# Patient Record
Sex: FEMALE | Race: WHITE | NOT HISPANIC OR LATINO | Employment: UNEMPLOYED | URBAN - NONMETROPOLITAN AREA
[De-identification: names, ages, dates, MRNs, and addresses within clinical notes are randomized per-mention and may not be internally consistent; named-entity substitution may affect disease eponyms.]

---

## 2023-10-18 ENCOUNTER — HOSPITAL ENCOUNTER (OUTPATIENT)
Dept: RADIOLOGY | Facility: HOSPITAL | Age: 48
Discharge: HOME | End: 2023-10-18
Payer: COMMERCIAL

## 2023-10-18 DIAGNOSIS — Z12.31 ENCOUNTER FOR SCREENING MAMMOGRAM FOR MALIGNANT NEOPLASM OF BREAST: ICD-10-CM

## 2023-10-18 PROCEDURE — 77063 BREAST TOMOSYNTHESIS BI: CPT | Mod: 50

## 2023-10-18 PROCEDURE — 77067 SCR MAMMO BI INCL CAD: CPT

## 2023-10-18 PROCEDURE — 77063 BREAST TOMOSYNTHESIS BI: CPT | Mod: BILATERAL PROCEDURE | Performed by: RADIOLOGY

## 2023-10-18 PROCEDURE — 77067 SCR MAMMO BI INCL CAD: CPT | Mod: BILATERAL PROCEDURE | Performed by: RADIOLOGY

## 2024-02-27 ENCOUNTER — OFFICE VISIT (OUTPATIENT)
Dept: PRIMARY CARE | Facility: CLINIC | Age: 49
End: 2024-02-27
Payer: COMMERCIAL

## 2024-02-27 VITALS
SYSTOLIC BLOOD PRESSURE: 140 MMHG | HEART RATE: 94 BPM | HEIGHT: 67 IN | BODY MASS INDEX: 24.63 KG/M2 | DIASTOLIC BLOOD PRESSURE: 90 MMHG | WEIGHT: 156.9 LBS

## 2024-02-27 DIAGNOSIS — G89.29 CHRONIC PAIN OF RIGHT KNEE: ICD-10-CM

## 2024-02-27 DIAGNOSIS — I10 PRIMARY HYPERTENSION: Primary | ICD-10-CM

## 2024-02-27 DIAGNOSIS — K21.9 GASTROESOPHAGEAL REFLUX DISEASE, UNSPECIFIED WHETHER ESOPHAGITIS PRESENT: ICD-10-CM

## 2024-02-27 DIAGNOSIS — M25.561 CHRONIC PAIN OF RIGHT KNEE: ICD-10-CM

## 2024-02-27 PROCEDURE — 3077F SYST BP >= 140 MM HG: CPT | Performed by: FAMILY MEDICINE

## 2024-02-27 PROCEDURE — 3080F DIAST BP >= 90 MM HG: CPT | Performed by: FAMILY MEDICINE

## 2024-02-27 PROCEDURE — 99214 OFFICE O/P EST MOD 30 MIN: CPT | Performed by: FAMILY MEDICINE

## 2024-02-27 RX ORDER — OMEPRAZOLE 40 MG/1
40 CAPSULE, DELAYED RELEASE ORAL
Qty: 30 CAPSULE | Refills: 11 | Status: SHIPPED | OUTPATIENT
Start: 2024-02-27 | End: 2024-03-08

## 2024-02-27 RX ORDER — LISINOPRIL 10 MG/1
10 TABLET ORAL DAILY
Qty: 30 TABLET | Refills: 5 | Status: SHIPPED | OUTPATIENT
Start: 2024-02-27 | End: 2024-04-24 | Stop reason: SDUPTHER

## 2024-02-27 NOTE — PROGRESS NOTES
Subjective   Carolyne Mi is a 48 y.o. female who presents for No chief complaint on file..  Here for a check up - she was previously a patient of Suyapa Mohr, last seen here Nov 2021.      She states that she fell on her right knee a couple of years ago and it locks up sometimes.  She would like to get a disability placard.  She is not interested in Xrays or referral to ortho at this time.      She is having issues with heartburn recently as well.      Her BP is running high, it has been high in the past as well.  She has not been on medication for it.              Objective   Visit Vitals  /90 (BP Location: Left arm, Patient Position: Sitting, BP Cuff Size: Adult)   Pulse 94      Physical Exam  Vitals reviewed.   Constitutional:       General: She is not in acute distress.  Cardiovascular:      Rate and Rhythm: Normal rate and regular rhythm.      Heart sounds: No murmur heard.  Pulmonary:      Effort: Pulmonary effort is normal. No respiratory distress.      Breath sounds: Normal breath sounds.   Skin:     General: Skin is warm and dry.   Neurological:      General: No focal deficit present.      Mental Status: She is alert. Mental status is at baseline.         Assessment/Plan   Problem List Items Addressed This Visit    None  Visit Diagnoses       Primary hypertension    -  Primary    Relevant Medications    lisinopril 10 mg tablet    Other Relevant Orders    CBC and Auto Differential    Comprehensive Metabolic Panel    Lipid Panel    TSH with reflex to Free T4 if abnormal    Vitamin B12    Gastroesophageal reflux disease, unspecified whether esophagitis present        Relevant Medications    omeprazole (PriLOSEC) 40 mg DR capsule    Chronic pain of right knee        Relevant Orders    Disability Placard               Mitra Cruz MD

## 2024-02-27 NOTE — PROGRESS NOTES
Subjective   Patient ID: Carolyne Mi is a 48 y.o. female who presents for yearly check up     HPI     Review of Systems    Objective   There were no vitals taken for this visit.    Physical Exam    Assessment/Plan

## 2024-02-28 ENCOUNTER — LAB (OUTPATIENT)
Dept: LAB | Facility: LAB | Age: 49
End: 2024-02-28
Payer: COMMERCIAL

## 2024-02-28 DIAGNOSIS — I10 PRIMARY HYPERTENSION: ICD-10-CM

## 2024-02-28 LAB
ALBUMIN SERPL BCP-MCNC: 4.3 G/DL (ref 3.4–5)
ALP SERPL-CCNC: 56 U/L (ref 33–110)
ALT SERPL W P-5'-P-CCNC: 9 U/L (ref 7–45)
ANION GAP SERPL CALC-SCNC: 10 MMOL/L (ref 10–20)
AST SERPL W P-5'-P-CCNC: 12 U/L (ref 9–39)
BASOPHILS # BLD AUTO: 0.05 X10*3/UL (ref 0–0.1)
BASOPHILS NFR BLD AUTO: 0.5 %
BILIRUB SERPL-MCNC: 0.5 MG/DL (ref 0–1.2)
BUN SERPL-MCNC: 7 MG/DL (ref 6–23)
CALCIUM SERPL-MCNC: 9.3 MG/DL (ref 8.6–10.3)
CHLORIDE SERPL-SCNC: 108 MMOL/L (ref 98–107)
CHOLEST SERPL-MCNC: 163 MG/DL (ref 0–199)
CHOLESTEROL/HDL RATIO: 5.8
CO2 SERPL-SCNC: 26 MMOL/L (ref 21–32)
CREAT SERPL-MCNC: 0.86 MG/DL (ref 0.5–1.05)
EGFRCR SERPLBLD CKD-EPI 2021: 84 ML/MIN/1.73M*2
EOSINOPHIL # BLD AUTO: 0.19 X10*3/UL (ref 0–0.7)
EOSINOPHIL NFR BLD AUTO: 1.7 %
ERYTHROCYTE [DISTWIDTH] IN BLOOD BY AUTOMATED COUNT: 13.9 % (ref 11.5–14.5)
GLUCOSE SERPL-MCNC: 87 MG/DL (ref 74–99)
HCT VFR BLD AUTO: 48.2 % (ref 36–46)
HDLC SERPL-MCNC: 28 MG/DL
HGB BLD-MCNC: 15.5 G/DL (ref 12–16)
IMM GRANULOCYTES # BLD AUTO: 0.06 X10*3/UL (ref 0–0.7)
IMM GRANULOCYTES NFR BLD AUTO: 0.6 % (ref 0–0.9)
LDLC SERPL CALC-MCNC: 80 MG/DL
LYMPHOCYTES # BLD AUTO: 3.86 X10*3/UL (ref 1.2–4.8)
LYMPHOCYTES NFR BLD AUTO: 35.5 %
MCH RBC QN AUTO: 27.6 PG (ref 26–34)
MCHC RBC AUTO-ENTMCNC: 32.2 G/DL (ref 32–36)
MCV RBC AUTO: 86 FL (ref 80–100)
MONOCYTES # BLD AUTO: 0.74 X10*3/UL (ref 0.1–1)
MONOCYTES NFR BLD AUTO: 6.8 %
NEUTROPHILS # BLD AUTO: 5.96 X10*3/UL (ref 1.2–7.7)
NEUTROPHILS NFR BLD AUTO: 54.9 %
NON HDL CHOLESTEROL: 135 MG/DL (ref 0–149)
NRBC BLD-RTO: 0 /100 WBCS (ref 0–0)
PLATELET # BLD AUTO: 173 X10*3/UL (ref 150–450)
POTASSIUM SERPL-SCNC: 4.4 MMOL/L (ref 3.5–5.3)
PROT SERPL-MCNC: 7 G/DL (ref 6.4–8.2)
RBC # BLD AUTO: 5.61 X10*6/UL (ref 4–5.2)
SODIUM SERPL-SCNC: 140 MMOL/L (ref 136–145)
TRIGL SERPL-MCNC: 273 MG/DL (ref 0–149)
TSH SERPL-ACNC: 2.21 MIU/L (ref 0.44–3.98)
VIT B12 SERPL-MCNC: 169 PG/ML (ref 211–911)
VLDL: 55 MG/DL (ref 0–40)
WBC # BLD AUTO: 10.9 X10*3/UL (ref 4.4–11.3)

## 2024-02-28 PROCEDURE — 82607 VITAMIN B-12: CPT

## 2024-02-28 PROCEDURE — 80061 LIPID PANEL: CPT

## 2024-02-28 PROCEDURE — 85025 COMPLETE CBC W/AUTO DIFF WBC: CPT

## 2024-02-28 PROCEDURE — 36415 COLL VENOUS BLD VENIPUNCTURE: CPT

## 2024-02-28 PROCEDURE — 84443 ASSAY THYROID STIM HORMONE: CPT

## 2024-02-28 PROCEDURE — 80053 COMPREHEN METABOLIC PANEL: CPT

## 2024-03-08 ENCOUNTER — OFFICE VISIT (OUTPATIENT)
Dept: PRIMARY CARE | Facility: CLINIC | Age: 49
End: 2024-03-08
Payer: COMMERCIAL

## 2024-03-08 VITALS
BODY MASS INDEX: 24.08 KG/M2 | HEART RATE: 97 BPM | DIASTOLIC BLOOD PRESSURE: 82 MMHG | SYSTOLIC BLOOD PRESSURE: 130 MMHG | WEIGHT: 153.39 LBS | HEIGHT: 67 IN | OXYGEN SATURATION: 96 %

## 2024-03-08 DIAGNOSIS — K21.9 GASTROESOPHAGEAL REFLUX DISEASE, UNSPECIFIED WHETHER ESOPHAGITIS PRESENT: Primary | ICD-10-CM

## 2024-03-08 DIAGNOSIS — E53.8 VITAMIN B12 DEFICIENCY: ICD-10-CM

## 2024-03-08 PROCEDURE — 99213 OFFICE O/P EST LOW 20 MIN: CPT | Performed by: FAMILY MEDICINE

## 2024-03-08 RX ORDER — FAMOTIDINE 20 MG/1
20 TABLET, FILM COATED ORAL 2 TIMES DAILY
Qty: 60 TABLET | Refills: 5 | Status: SHIPPED | OUTPATIENT
Start: 2024-03-08 | End: 2024-04-24 | Stop reason: SDUPTHER

## 2024-03-08 RX ORDER — LANOLIN ALCOHOL/MO/W.PET/CERES
1000 CREAM (GRAM) TOPICAL DAILY
Qty: 30 TABLET | Refills: 11 | Status: SHIPPED | OUTPATIENT
Start: 2024-03-08 | End: 2024-04-24 | Stop reason: SDUPTHER

## 2024-03-08 RX ORDER — PANTOPRAZOLE SODIUM 40 MG/1
40 TABLET, DELAYED RELEASE ORAL DAILY
Qty: 30 TABLET | Refills: 5 | Status: SHIPPED | OUTPATIENT
Start: 2024-03-08 | End: 2024-04-24 | Stop reason: SDUPTHER

## 2024-03-08 NOTE — PROGRESS NOTES
Subjective   Patient ID: Carolyne Mi is a 48 y.o. female who presents for follow up. Was originally suppose to be 2 weeks but the patient states their heartburn has gotten worse. Patient states when they lay down is when the heartburn starts.,     HPI     Review of Systems    Objective   There were no vitals taken for this visit.    Physical Exam    Assessment/Plan

## 2024-03-12 ENCOUNTER — APPOINTMENT (OUTPATIENT)
Dept: PRIMARY CARE | Facility: CLINIC | Age: 49
End: 2024-03-12

## 2024-03-22 ENCOUNTER — OFFICE VISIT (OUTPATIENT)
Dept: PRIMARY CARE | Facility: CLINIC | Age: 49
End: 2024-03-22
Payer: COMMERCIAL

## 2024-03-22 ENCOUNTER — OFFICE VISIT (OUTPATIENT)
Dept: OBSTETRICS AND GYNECOLOGY | Facility: CLINIC | Age: 49
End: 2024-03-22
Payer: COMMERCIAL

## 2024-03-22 VITALS
DIASTOLIC BLOOD PRESSURE: 68 MMHG | WEIGHT: 152.1 LBS | HEIGHT: 67 IN | HEART RATE: 100 BPM | BODY MASS INDEX: 23.87 KG/M2 | SYSTOLIC BLOOD PRESSURE: 104 MMHG

## 2024-03-22 VITALS
WEIGHT: 152.4 LBS | SYSTOLIC BLOOD PRESSURE: 112 MMHG | DIASTOLIC BLOOD PRESSURE: 70 MMHG | BODY MASS INDEX: 23.92 KG/M2 | HEIGHT: 67 IN

## 2024-03-22 DIAGNOSIS — Z12.31 ENCOUNTER FOR SCREENING MAMMOGRAM FOR BREAST CANCER: ICD-10-CM

## 2024-03-22 DIAGNOSIS — K21.9 GASTROESOPHAGEAL REFLUX DISEASE WITHOUT ESOPHAGITIS: ICD-10-CM

## 2024-03-22 DIAGNOSIS — Z01.419 ENCOUNTER FOR GYNECOLOGICAL EXAMINATION WITHOUT ABNORMAL FINDING: ICD-10-CM

## 2024-03-22 DIAGNOSIS — K21.9 GASTROESOPHAGEAL REFLUX DISEASE, UNSPECIFIED WHETHER ESOPHAGITIS PRESENT: Primary | ICD-10-CM

## 2024-03-22 DIAGNOSIS — Z12.4 ENCOUNTER FOR SCREENING FOR CERVICAL CANCER: ICD-10-CM

## 2024-03-22 PROCEDURE — 99213 OFFICE O/P EST LOW 20 MIN: CPT | Performed by: FAMILY MEDICINE

## 2024-03-22 PROCEDURE — 88175 CYTOPATH C/V AUTO FLUID REDO: CPT

## 2024-03-22 PROCEDURE — 99396 PREV VISIT EST AGE 40-64: CPT | Performed by: OBSTETRICS & GYNECOLOGY

## 2024-03-22 RX ORDER — SUCRALFATE 1 G/1
1 TABLET ORAL
Qty: 120 TABLET | Refills: 11 | Status: SHIPPED | OUTPATIENT
Start: 2024-03-22 | End: 2024-04-24 | Stop reason: SDUPTHER

## 2024-03-22 NOTE — PROGRESS NOTES
Carolyne Mi is a 48 y.o. female who is here for a routine exam. PCP = Mitra Cruz MD    Chief Complaint   Patient presents with    Gynecologic Exam     Patient is here for yearly exam and pap test. Patient does do regular self breast exams and has no concerns at this time. LMP: 24.          Presents for annual exam. She voices no complaints and is doing well. Denies any bowel or bladder problems. Denies any breast problems.  She had previous tubal sterilization.    OB History          2    Para   2    Term   0       2    AB   0    Living   2         SAB   0    IAB   0    Ectopic   0    Multiple   0    Live Births   2                  Social History     Substance and Sexual Activity   Sexual Activity Yes    Birth control/protection: Female Sterilization     Current contraception: Tubal Sterilization    Past Medical History:   Diagnosis Date    Encounter for  delivery without indication     Delivery of pregnancy by  section    Encounter for gynecological examination (general) (routine) without abnormal findings 2020    Pap test, as part of routine gynecological examination    Other conditions influencing health status     Menstruation    Personal history of other diseases of the female genital tract     History of menorrhagia    Personal history of other medical treatment     History of screening mammography    Personal history of other mental and behavioral disorders     History of major depression    Personal history of suicidal behavior     History of suicide attempt       Past Surgical History:   Procedure Laterality Date    OTHER SURGICAL HISTORY  2020    Colposcopy    TUBAL LIGATION         Past med hx and past surg hx reviewed and notable for: none    Review of Systems:   Constitutional: No fever or chills  Respiratory: No shortness of breath, or cough  Cardiovascular: No chest pain or syncope  Breasts: No breast pain, no masses, no nipple  "discharge  Gastrointestinal: No nausea, vomiting, or diarrhea, no abdominal pain  Genitourinary: No dysuria or frequency  Gynecology: Negative except as noted in history of present illness  All other: All other systems reviewed and negative for complaint    Objective   /70   Ht 1.702 m (5' 7\")   Wt 69.1 kg (152 lb 6.4 oz)   LMP 02/29/2024   BMI 23.87 kg/m²     PHYSICAL EXAMINATION:  Well-developed, well nourished, in no acute distress, alert and oriented x three, is pleasant and cooperative.   HEENT: Clear. Pupils equal, round and reactive to light and accommodation. Extraocular muscles are intact. Oral mucosa pink without exudate.   NECK: No lymphadenopathy, no thyromegaly.  BREASTS: Symmetric, no palpable masses. No nipple discharge or retraction.  LUNGS: Clear bilaterally.  HEART: Regular rate and rhythm without murmurs.  ABDOMEN: Normoactive bowel sounds, soft and nontender, no guarding or rebound tenderness, no CVA tenderness.  EXTREMITIES: No clubbing, cyanosis or edema.  NEUROLOGIC:  Cranial nerves II-XII grossly intact.  :  Normal external female genitalia, normal vulva, normal vagina. Normal urethral meatus, urethra and bladder. Normal appearing cervix. Normal-sized uterus, no adnexal masses or tenderness. Pap smear performed today.      Actions performed during this visit include:  - Clinical breast exam  - Clinical pelvic exam  - No orders of the defined types were placed in this encounter.       Problem List Items Addressed This Visit    None  Visit Diagnoses       Encounter for gynecological examination without abnormal finding        Relevant Orders    THINPREP PAP TEST    Encounter for screening for cervical cancer        Relevant Orders    THINPREP PAP TEST    Encounter for screening mammogram for breast cancer                 Provider Impression:  1.  Annual  She already had her mammogram performed earlier in the year.    Thank you for coming to your annual exam. Your findings during the " exam were normal.  Please return for your next visit in 1 year.

## 2024-03-22 NOTE — PROGRESS NOTES
Subjective   Patient ID: Carolyne Mi is a 48 y.o. female who presents for follow up to heartburn. Patient is still waking up with heartburn during the night. Patient has cut back on caffine, sugar.         HPI     Review of Systems    Objective   LMP 02/29/2024     Physical Exam    Assessment/Plan

## 2024-03-22 NOTE — PROGRESS NOTES
Subjective   Carolyne Mi is a 48 y.o. female who presents for No chief complaint on file..  Here for follow up heartburn.  She continues to struggle with it, still waking up at night.  At her last visit we switched to protonix and added some pepcid as well.  She states that it is better during the day, but still bothering her at night.  She has tried to cut back on caffeine, watching her diet better.              Objective   Visit Vitals  /68 (BP Location: Left arm, Patient Position: Sitting, BP Cuff Size: Adult)   Pulse 100      Physical Exam  Vitals reviewed.   Constitutional:       General: She is not in acute distress.  Cardiovascular:      Rate and Rhythm: Normal rate and regular rhythm.      Heart sounds: No murmur heard.  Pulmonary:      Effort: Pulmonary effort is normal. No respiratory distress.      Breath sounds: Normal breath sounds.   Skin:     General: Skin is warm and dry.   Neurological:      General: No focal deficit present.      Mental Status: She is alert. Mental status is at baseline.         Assessment/Plan   Problem List Items Addressed This Visit    None  Visit Diagnoses       Gastroesophageal reflux disease, unspecified whether esophagitis present    -  Primary    Relevant Medications    sucralfate (Carafate) 1 gram tablet    Other Relevant Orders    EGD    Follow Up In Primary Care - Established               Mitra Cruz MD

## 2024-03-22 NOTE — PATIENT INSTRUCTIONS
Given her persistent symptoms that are waking her up at night we will add sucralfate and also get EGD set up.  Follow up here in 1 month.

## 2024-03-29 ENCOUNTER — TELEPHONE (OUTPATIENT)
Dept: PRIMARY CARE | Facility: CLINIC | Age: 49
End: 2024-03-29

## 2024-03-29 NOTE — TELEPHONE ENCOUNTER
Patient states their heartburn has finally eased up some. She is asking if she can take the sucralfate 3 times a day now instead of 4?

## 2024-04-01 LAB
CYTOLOGY CMNT CVX/VAG CYTO-IMP: NORMAL
LAB AP HPV GENOTYPE QUESTION: YES
LAB AP HPV HR: NORMAL
LABORATORY COMMENT REPORT: NORMAL
LMP START DATE: NORMAL
PATH REPORT.TOTAL CANCER: NORMAL

## 2024-04-10 ENCOUNTER — HOSPITAL ENCOUNTER (OUTPATIENT)
Dept: GASTROENTEROLOGY | Facility: CLINIC | Age: 49
Setting detail: OUTPATIENT SURGERY
Discharge: HOME | End: 2024-04-10
Payer: COMMERCIAL

## 2024-04-10 VITALS
WEIGHT: 149.03 LBS | HEIGHT: 62 IN | RESPIRATION RATE: 16 BRPM | OXYGEN SATURATION: 100 % | DIASTOLIC BLOOD PRESSURE: 75 MMHG | BODY MASS INDEX: 27.43 KG/M2 | TEMPERATURE: 98.3 F | SYSTOLIC BLOOD PRESSURE: 150 MMHG | HEART RATE: 82 BPM

## 2024-04-10 DIAGNOSIS — K21.9 GASTROESOPHAGEAL REFLUX DISEASE WITHOUT ESOPHAGITIS: Primary | ICD-10-CM

## 2024-04-10 PROCEDURE — 43239 EGD BIOPSY SINGLE/MULTIPLE: CPT | Performed by: INTERNAL MEDICINE

## 2024-04-10 PROCEDURE — 2500000005 HC RX 250 GENERAL PHARMACY W/O HCPCS: Performed by: INTERNAL MEDICINE

## 2024-04-10 PROCEDURE — 88305 TISSUE EXAM BY PATHOLOGIST: CPT | Performed by: PATHOLOGY

## 2024-04-10 PROCEDURE — 7100000010 HC PHASE TWO TIME - EACH INCREMENTAL 1 MINUTE

## 2024-04-10 PROCEDURE — 7100000009 HC PHASE TWO TIME - INITIAL BASE CHARGE

## 2024-04-10 PROCEDURE — 88305 TISSUE EXAM BY PATHOLOGIST: CPT | Mod: TC,SUR,SAMLAB | Performed by: INTERNAL MEDICINE

## 2024-04-10 PROCEDURE — 2500000004 HC RX 250 GENERAL PHARMACY W/ HCPCS (ALT 636 FOR OP/ED): Performed by: INTERNAL MEDICINE

## 2024-04-10 RX ORDER — FENTANYL CITRATE 50 UG/ML
INJECTION, SOLUTION INTRAMUSCULAR; INTRAVENOUS AS NEEDED
Status: COMPLETED | OUTPATIENT
Start: 2024-04-10 | End: 2024-04-10

## 2024-04-10 RX ORDER — MIDAZOLAM HYDROCHLORIDE 1 MG/ML
INJECTION, SOLUTION INTRAMUSCULAR; INTRAVENOUS AS NEEDED
Status: COMPLETED | OUTPATIENT
Start: 2024-04-10 | End: 2024-04-10

## 2024-04-10 RX ORDER — SODIUM CHLORIDE, SODIUM LACTATE, POTASSIUM CHLORIDE, CALCIUM CHLORIDE 600; 310; 30; 20 MG/100ML; MG/100ML; MG/100ML; MG/100ML
20 INJECTION, SOLUTION INTRAVENOUS CONTINUOUS
Status: DISCONTINUED | OUTPATIENT
Start: 2024-04-10 | End: 2024-04-11 | Stop reason: HOSPADM

## 2024-04-10 RX ADMIN — FENTANYL CITRATE 50 MCG: 50 INJECTION, SOLUTION INTRAMUSCULAR; INTRAVENOUS at 10:39

## 2024-04-10 RX ADMIN — MIDAZOLAM 2.5 MG: 1 INJECTION INTRAMUSCULAR; INTRAVENOUS at 10:39

## 2024-04-10 RX ADMIN — MIDAZOLAM 2.5 MG: 1 INJECTION INTRAMUSCULAR; INTRAVENOUS at 10:36

## 2024-04-10 RX ADMIN — FENTANYL CITRATE 50 MCG: 50 INJECTION, SOLUTION INTRAMUSCULAR; INTRAVENOUS at 10:36

## 2024-04-10 RX ADMIN — SODIUM CHLORIDE, POTASSIUM CHLORIDE, SODIUM LACTATE AND CALCIUM CHLORIDE 20 ML/HR: 600; 310; 30; 20 INJECTION, SOLUTION INTRAVENOUS at 09:47

## 2024-04-10 RX ADMIN — BENZOCAINE, BUTAMBEN, AND TETRACAINE HYDROCHLORIDE 2 SPRAY: .028; .004; .004 AEROSOL, SPRAY TOPICAL at 10:35

## 2024-04-10 ASSESSMENT — PAIN - FUNCTIONAL ASSESSMENT
PAIN_FUNCTIONAL_ASSESSMENT: 0-10
PAIN_FUNCTIONAL_ASSESSMENT: 0-10

## 2024-04-10 ASSESSMENT — PAIN SCALES - GENERAL
PAINLEVEL_OUTOF10: 0 - NO PAIN
PAINLEVEL_OUTOF10: 3
PAINLEVEL_OUTOF10: 0 - NO PAIN

## 2024-04-10 ASSESSMENT — COLUMBIA-SUICIDE SEVERITY RATING SCALE - C-SSRS
6. HAVE YOU EVER DONE ANYTHING, STARTED TO DO ANYTHING, OR PREPARED TO DO ANYTHING TO END YOUR LIFE?: NO
1. IN THE PAST MONTH, HAVE YOU WISHED YOU WERE DEAD OR WISHED YOU COULD GO TO SLEEP AND NOT WAKE UP?: NO
2. HAVE YOU ACTUALLY HAD ANY THOUGHTS OF KILLING YOURSELF?: NO

## 2024-04-10 NOTE — H&P
History Of Present Illness  Carolyne Mi is a 48 y.o. female presenting with recalcitrant GERD presents for EGD for diagnostic reasons..     Past Medical History  Past Medical History:   Diagnosis Date    Encounter for  delivery without indication     Delivery of pregnancy by  section    Encounter for gynecological examination (general) (routine) without abnormal findings 2020    Pap test, as part of routine gynecological examination    GERD (gastroesophageal reflux disease)     Hypertension     Other conditions influencing health status     Menstruation    Personal history of other diseases of the female genital tract     History of menorrhagia    Personal history of other medical treatment     History of screening mammography    Personal history of other mental and behavioral disorders     History of major depression    Personal history of suicidal behavior     History of suicide attempt     Surgical History  Past Surgical History:   Procedure Laterality Date    OTHER SURGICAL HISTORY  2020    Colposcopy    TUBAL LIGATION       Social History  She reports that she has been smoking cigarettes. She has never used smokeless tobacco. She reports current alcohol use. She reports that she does not currently use drugs.    Family History  Family History   Problem Relation Name Age of Onset    Heart attack Mother      Lung disease Father      Cancer Father          Allergies  No Known Allergies  Review of Systems  Pre-sedation Evaluation:  ASA Classification - ASA 2 - Patient with mild systemic disease with no functional limitations  Mallampati Score - II (hard and soft palate, upper portion of tonsils anduvula visible)    Physical Exam  Vitals and nursing note reviewed.   Constitutional:       Appearance: Normal appearance.   HENT:      Head: Normocephalic.      Mouth/Throat:      Mouth: Mucous membranes are moist.      Pharynx: Oropharynx is clear.   Eyes:      Conjunctiva/sclera:  "Conjunctivae normal.      Pupils: Pupils are equal, round, and reactive to light.   Cardiovascular:      Rate and Rhythm: Normal rate and regular rhythm.      Heart sounds: Normal heart sounds.   Pulmonary:      Effort: Pulmonary effort is normal.      Breath sounds: Normal breath sounds.   Abdominal:      General: Abdomen is flat. Bowel sounds are normal.      Palpations: Abdomen is soft.   Musculoskeletal:      Cervical back: Normal range of motion and neck supple.   Skin:     General: Skin is warm and dry.   Neurological:      General: No focal deficit present.      Mental Status: She is alert and oriented to person, place, and time.   Psychiatric:         Behavior: Behavior normal.          Last Recorded Vitals  Blood pressure 132/85, pulse 83, temperature 36.7 °C (98.1 °F), temperature source Temporal, resp. rate 16, height 1.575 m (5' 2\"), weight 67.6 kg (149 lb 0.5 oz), last menstrual period 02/29/2024, SpO2 99%.     Assessment/Plan   Problem List Items Addressed This Visit    None  Visit Diagnoses       Gastroesophageal reflux disease without esophagitis    -  Primary    Relevant Orders    EGD               PTA/Current Medications:  (Not in a hospital admission)    Current Outpatient Medications   Medication Sig Dispense Refill    cyanocobalamin (Vitamin B-12) 1,000 mcg tablet Take 1 tablet (1,000 mcg) by mouth once daily. 30 tablet 11    famotidine (Pepcid) 20 mg tablet Take 1 tablet (20 mg) by mouth 2 times a day. 60 tablet 5    lisinopril 10 mg tablet Take 1 tablet (10 mg) by mouth once daily. 30 tablet 5    pantoprazole (ProtoNix) 40 mg EC tablet Take 1 tablet (40 mg) by mouth once daily. Do not crush, chew, or split. 30 tablet 5    sucralfate (Carafate) 1 gram tablet Take 1 tablet (1 g) by mouth 4 times a day before meals. Before meals and at bedtime 120 tablet 11     Current Facility-Administered Medications   Medication Dose Route Frequency Provider Last Rate Last Admin    lactated Ringer's infusion  " 20 mL/hr intravenous Continuous Colt Flores DO 20 mL/hr at 04/10/24 0947 20 mL/hr at 04/10/24 0947     Colt Flores DO

## 2024-04-10 NOTE — DISCHARGE INSTRUCTIONS

## 2024-04-18 LAB
LABORATORY COMMENT REPORT: NORMAL
PATH REPORT.FINAL DX SPEC: NORMAL
PATH REPORT.GROSS SPEC: NORMAL
PATH REPORT.TOTAL CANCER: NORMAL

## 2024-04-23 ENCOUNTER — APPOINTMENT (OUTPATIENT)
Dept: PRIMARY CARE | Facility: CLINIC | Age: 49
End: 2024-04-23

## 2024-04-23 ENCOUNTER — PHARMACY VISIT (OUTPATIENT)
Dept: PHARMACY | Facility: CLINIC | Age: 49
End: 2024-04-23
Payer: MEDICAID

## 2024-04-23 PROCEDURE — RXMED WILLOW AMBULATORY MEDICATION CHARGE

## 2024-04-24 ENCOUNTER — OFFICE VISIT (OUTPATIENT)
Dept: PRIMARY CARE | Facility: CLINIC | Age: 49
End: 2024-04-24
Payer: COMMERCIAL

## 2024-04-24 VITALS
HEIGHT: 62 IN | HEART RATE: 86 BPM | WEIGHT: 151.8 LBS | DIASTOLIC BLOOD PRESSURE: 66 MMHG | BODY MASS INDEX: 27.94 KG/M2 | OXYGEN SATURATION: 97 % | SYSTOLIC BLOOD PRESSURE: 114 MMHG

## 2024-04-24 DIAGNOSIS — I10 PRIMARY HYPERTENSION: ICD-10-CM

## 2024-04-24 DIAGNOSIS — K21.9 GASTROESOPHAGEAL REFLUX DISEASE, UNSPECIFIED WHETHER ESOPHAGITIS PRESENT: ICD-10-CM

## 2024-04-24 DIAGNOSIS — E53.8 VITAMIN B12 DEFICIENCY: ICD-10-CM

## 2024-04-24 PROCEDURE — 99214 OFFICE O/P EST MOD 30 MIN: CPT | Performed by: FAMILY MEDICINE

## 2024-04-24 PROCEDURE — 3074F SYST BP LT 130 MM HG: CPT | Performed by: FAMILY MEDICINE

## 2024-04-24 PROCEDURE — 3078F DIAST BP <80 MM HG: CPT | Performed by: FAMILY MEDICINE

## 2024-04-24 RX ORDER — LANOLIN ALCOHOL/MO/W.PET/CERES
1000 CREAM (GRAM) TOPICAL DAILY
Qty: 90 TABLET | Refills: 3 | Status: SHIPPED | OUTPATIENT
Start: 2024-04-24 | End: 2025-04-24

## 2024-04-24 RX ORDER — LISINOPRIL 10 MG/1
10 TABLET ORAL DAILY
Qty: 90 TABLET | Refills: 1 | Status: SHIPPED | OUTPATIENT
Start: 2024-04-24 | End: 2024-10-21

## 2024-04-24 RX ORDER — PANTOPRAZOLE SODIUM 40 MG/1
40 TABLET, DELAYED RELEASE ORAL DAILY
Qty: 90 TABLET | Refills: 1 | Status: SHIPPED | OUTPATIENT
Start: 2024-04-24 | End: 2024-10-21

## 2024-04-24 RX ORDER — FAMOTIDINE 20 MG/1
20 TABLET, FILM COATED ORAL 2 TIMES DAILY
Qty: 180 TABLET | Refills: 1 | Status: SHIPPED | OUTPATIENT
Start: 2024-04-24 | End: 2024-10-21

## 2024-04-24 RX ORDER — SUCRALFATE 1 G/1
1 TABLET ORAL
Qty: 360 TABLET | Refills: 1 | Status: SHIPPED | OUTPATIENT
Start: 2024-04-24 | End: 2024-10-21

## 2024-04-24 NOTE — PROGRESS NOTES
Subjective   Carolyne Mi is a 48 y.o. female who presents for Follow-up (Patient here with her fiance, Ronaldo, for a check-up.  Patient voices no complaints at present.).  Here for follow up GERD.  She recently had EGD which was consistent with gastritis.  She is waiting to get an appointment with GI now.  Her symptoms are much better - she has cut back on the carafate to tid unless she needs a fourth dose.              Objective   Visit Vitals  /66 (BP Location: Left arm, Patient Position: Sitting, BP Cuff Size: Adult)   Pulse 86      Physical Exam  Vitals reviewed.   Constitutional:       General: She is not in acute distress.  Cardiovascular:      Rate and Rhythm: Normal rate and regular rhythm.      Heart sounds: No murmur heard.  Pulmonary:      Effort: Pulmonary effort is normal. No respiratory distress.      Breath sounds: Normal breath sounds.   Skin:     General: Skin is warm and dry.   Neurological:      General: No focal deficit present.      Mental Status: She is alert. Mental status is at baseline.         Assessment/Plan   Problem List Items Addressed This Visit       Gastroesophageal reflux disease    Relevant Medications    famotidine (Pepcid) 20 mg tablet    pantoprazole (ProtoNix) 40 mg EC tablet    sucralfate (Carafate) 1 gram tablet    Primary hypertension    Relevant Medications    lisinopril 10 mg tablet    Vitamin B12 deficiency    Relevant Medications    cyanocobalamin (Vitamin B-12) 1,000 mcg tablet          Mitra Cruz MD

## 2024-05-08 ENCOUNTER — TELEPHONE (OUTPATIENT)
Dept: GASTROENTEROLOGY | Facility: CLINIC | Age: 49
End: 2024-05-08
Payer: COMMERCIAL

## 2024-05-08 NOTE — TELEPHONE ENCOUNTER
PATIENT NOTIFIED AND VERBALIZED UNDERSTANDING     ----- Message from Colt Flores DO sent at 5/7/2024  6:42 AM EDT -----  Upper endoscopy revealed minimal reflux changes no hiatal hernia biopsies for H. pylori and celiac disease were negative.  Follow-up with family doctor

## 2024-05-31 ENCOUNTER — HOSPITAL ENCOUNTER (EMERGENCY)
Facility: HOSPITAL | Age: 49
Discharge: HOME | End: 2024-05-31
Payer: COMMERCIAL

## 2024-05-31 ENCOUNTER — APPOINTMENT (OUTPATIENT)
Dept: RADIOLOGY | Facility: HOSPITAL | Age: 49
End: 2024-05-31
Payer: COMMERCIAL

## 2024-05-31 VITALS
DIASTOLIC BLOOD PRESSURE: 78 MMHG | HEART RATE: 78 BPM | TEMPERATURE: 97.3 F | SYSTOLIC BLOOD PRESSURE: 144 MMHG | HEIGHT: 67 IN | RESPIRATION RATE: 18 BRPM | OXYGEN SATURATION: 100 % | WEIGHT: 152 LBS | BODY MASS INDEX: 23.86 KG/M2

## 2024-05-31 DIAGNOSIS — M25.562 ACUTE PAIN OF LEFT KNEE: Primary | ICD-10-CM

## 2024-05-31 PROCEDURE — 73560 X-RAY EXAM OF KNEE 1 OR 2: CPT | Mod: LEFT SIDE | Performed by: RADIOLOGY

## 2024-05-31 PROCEDURE — 99283 EMERGENCY DEPT VISIT LOW MDM: CPT

## 2024-05-31 PROCEDURE — 73560 X-RAY EXAM OF KNEE 1 OR 2: CPT | Mod: LT

## 2024-05-31 ASSESSMENT — PAIN DESCRIPTION - DESCRIPTORS
DESCRIPTORS: ACHING
DESCRIPTORS: ACHING

## 2024-05-31 ASSESSMENT — PAIN DESCRIPTION - ORIENTATION: ORIENTATION: LEFT

## 2024-05-31 ASSESSMENT — PAIN SCALES - GENERAL: PAINLEVEL_OUTOF10: 8

## 2024-05-31 ASSESSMENT — PAIN DESCRIPTION - LOCATION: LOCATION: KNEE

## 2024-05-31 ASSESSMENT — PAIN - FUNCTIONAL ASSESSMENT: PAIN_FUNCTIONAL_ASSESSMENT: 0-10

## 2024-05-31 ASSESSMENT — PAIN DESCRIPTION - PAIN TYPE: TYPE: ACUTE PAIN

## 2024-05-31 NOTE — DISCHARGE INSTRUCTIONS
Ice elevate and rest.  Tylenol Motrin for pain.  Use as directed.  Ace wrap as needed for comfort.  Crutches as needed for comfort.  Follow-up with orthopedic specialist listed above or your orthopedic specialist within the next week or so.

## 2024-05-31 NOTE — ED PROVIDER NOTES
"HPI   Chief Complaint   Patient presents with    Knee Pain     Patient reports she bent down to  a cat last Saturday and her left knee \"popped\" and has hurt since.        Patient presents with left knee pain for over a week now.  States she bent over and when she stood up she felt a pop in her knee and she has had pain since.  States that she cannot fully extend it but she is able to bend it.  No self treatment.  Pain is worse with movement and ambulation.  Relief at rest.  She denies any swelling or discoloration.  Denies any other injury.      History provided by:  Patient                      [place] Coma Scale Score: 15                     Patient History   Past Medical History:   Diagnosis Date    Encounter for  delivery without indication ([facility])     Delivery of pregnancy by  section    Encounter for gynecological examination (general) (routine) without abnormal findings 2020    Pap test, as part of routine gynecological examination    GERD (gastroesophageal reflux disease)     Hypertension     Other conditions influencing health status     Menstruation    Personal history of other diseases of the female genital tract     History of menorrhagia    Personal history of other medical treatment     History of screening mammography    Personal history of other mental and behavioral disorders     History of major depression    Personal history of suicidal behavior     History of suicide attempt     Past Surgical History:   Procedure Laterality Date     SECTION, CLASSIC       and     OTHER SURGICAL HISTORY  2020    Colposcopy    TUBAL LIGATION       Family History   Problem Relation Name Age of Onset    Hypertension Mother      Lung disease Father      Cancer Father       Social History     Tobacco Use    Smoking status: Every Day     Current packs/day: 0.50     Average packs/day: 1 pack/day for 31.2 years (31.1 ttl pk-yrs)     Types: Cigarettes     Start date: " 4/1/1993     Last attempt to quit: 4/10/2024    Smokeless tobacco: Never   Vaping Use    Vaping status: Former    Substances: Nicotine    Devices: Disposable, Pre-filled or refillable cartridge   Substance Use Topics    Alcohol use: Yes    Drug use: Not Currently       Physical Exam   ED Triage Vitals [05/31/24 1156]   Temperature Heart Rate Respirations BP   36.3 °C (97.3 °F) 81 16 141/79      Pulse Ox Temp Source Heart Rate Source Patient Position   100 % Temporal Monitor Sitting      BP Location FiO2 (%)     Left arm --       Physical Exam  Vitals and nursing note reviewed.   Constitutional:       General: She is not in acute distress.     Appearance: Normal appearance. She is well-developed, well-groomed and normal weight. She is not ill-appearing or toxic-appearing.   HENT:      Head: Normocephalic.      Right Ear: External ear normal.      Left Ear: External ear normal.      Nose: Nose normal.      Mouth/Throat:      Mouth: Mucous membranes are moist.   Eyes:      General: No scleral icterus.     Conjunctiva/sclera: Conjunctivae normal.   Musculoskeletal:         General: Tenderness present.      Cervical back: No tenderness.      Left knee: No swelling, effusion, bony tenderness or crepitus. Decreased range of motion (Patient lacks about the last 5 degrees of full extension). Tenderness present over the medial joint line. No patellar tendon tenderness.   Skin:     General: Skin is warm.      Capillary Refill: Capillary refill takes less than 2 seconds.   Neurological:      General: No focal deficit present.      Mental Status: She is alert and oriented to person, place, and time.      Cranial Nerves: No cranial nerve deficit or facial asymmetry.      Sensory: No sensory deficit.      Motor: No weakness.      Comments: Patient has analgesic gait   Psychiatric:         Attention and Perception: Attention and perception normal.         Mood and Affect: Mood and affect normal.         Speech: Speech normal.          Behavior: Behavior normal. Behavior is cooperative.         Thought Content: Thought content normal.         Cognition and Memory: Cognition and memory normal.         Judgment: Judgment normal.         ED Course & MDM   Diagnoses as of 05/31/24 1350   Acute pain of left knee       Medical Decision Making  Patient presents with left knee pain for over a week now.  States she bent over and when she stood up she felt a pop in her knee and she has had pain since.  States that she cannot fully extend it but she is able to bend it.  No self treatment.  Pain is worse with movement and ambulation.  Relief at rest.  She denies any swelling or discoloration.  Denies any other injury.    Ddx: Fracture, contusion, strain, sprain, other    Will obtain x-rays  No acute findings are noted by radiologist   patient declined pain medication while in the ED  Will Ace wrap patient's knee and give her crutches to help with ambulation.  She is encouraged to follow-up with orthopedics within the next week.  Continue Tylenol Motrin for pain.  Ice elevate and rest.  Patient discharged home in improved stable condition      Amount and/or Complexity of Data Reviewed  Radiology: ordered and independent interpretation performed. Decision-making details documented in ED Course.    Risk  OTC drugs.  Diagnosis or treatment significantly limited by social determinants of health.        Procedure  Procedures     Shahla Avilez PA-C  05/31/24 1350

## 2024-06-03 ENCOUNTER — OFFICE VISIT (OUTPATIENT)
Dept: ORTHOPEDIC SURGERY | Facility: CLINIC | Age: 49
End: 2024-06-03
Payer: COMMERCIAL

## 2024-06-03 DIAGNOSIS — M25.562 ACUTE PAIN OF LEFT KNEE: ICD-10-CM

## 2024-06-03 DIAGNOSIS — S86.912A KNEE STRAIN, LEFT, INITIAL ENCOUNTER: Primary | ICD-10-CM

## 2024-06-03 PROCEDURE — 99204 OFFICE O/P NEW MOD 45 MIN: CPT | Performed by: NURSE PRACTITIONER

## 2024-06-03 RX ORDER — IBUPROFEN 600 MG/1
600 TABLET ORAL EVERY 8 HOURS PRN
Qty: 28 TABLET | Refills: 1 | Status: SHIPPED | OUTPATIENT
Start: 2024-06-03

## 2024-06-03 ASSESSMENT — ENCOUNTER SYMPTOMS
CARDIOVASCULAR NEGATIVE: 1
RESPIRATORY NEGATIVE: 1
CONSTITUTIONAL NEGATIVE: 1
NEUROLOGICAL NEGATIVE: 1
PSYCHIATRIC NEGATIVE: 1
HEMATOLOGIC/LYMPHATIC NEGATIVE: 1
ARTHRALGIAS: 1
ENDOCRINE NEGATIVE: 1

## 2024-06-03 ASSESSMENT — PAIN DESCRIPTION - DESCRIPTORS: DESCRIPTORS: SHARP;SHOOTING;ACHING;DULL

## 2024-06-03 ASSESSMENT — PAIN - FUNCTIONAL ASSESSMENT: PAIN_FUNCTIONAL_ASSESSMENT: 0-10

## 2024-06-03 ASSESSMENT — PAIN SCALES - GENERAL: PAINLEVEL_OUTOF10: 8

## 2024-06-03 NOTE — PROGRESS NOTES
"Subjective    Patient ID: Carolyne Mi is a 48 y.o. female.    Chief Complaint: Pain and New Patient Visit of the Left Knee (Patient states about 22 weeks ago she heard a \"popping noise\" when she stood up from laying down on the ground.  X-rays were completed on 05/31/2024.  She was given an ace wrap and crutches.)     JAZMINE Romero is a pleasant 48-year-old female presenting to the Ortho injury clinic for evaluation of left knee pain and popping sensation.  This occurred on 5/25/2024 when she was sitting up from lying down and twisted her knee while picking up a cat.  States she heard a popping sensation and has had pain since.  Did not notice any swelling or discoloration.  Was seen in ED on 5/31/2024, given an Ace wrap and crutches.  Patient is putting about 50% weightbearing on her leg, taking Tylenol approximately twice daily which does seem to help with pain.  Denies any prior injuries or surgeries to this knee.  Symptoms are aggravated with full weightbearing and attempt to straighten leg.    Review of Systems   Constitutional: Negative.    HENT: Negative.     Respiratory: Negative.     Cardiovascular: Negative.    Endocrine: Negative.    Musculoskeletal:  Positive for arthralgias.   Skin: Negative.    Neurological: Negative.    Hematological: Negative.    Psychiatric/Behavioral: Negative.          Objective   Right Knee Exam   Right knee exam is normal.      Left Knee Exam     Tenderness   The patient is experiencing tenderness in the medial joint line.    Range of Motion   Extension:  5   Flexion:  110     Tests   Edgar:  Medial - negative Lateral - negative  Varus: negative Valgus: negative  Lachman:  Anterior - negative      Drawer:  Anterior - negative     Posterior - negative    Other   Erythema: absent  Sensation: normal  Pulse: present  Swelling: mild  Effusion: no effusion present    Comments:  No instability noted, positive increased pain with medial Apley's testing.  Distracted exam allows for full " extension of the knee, increased pain with attempt with straight leg lift, quad strength strong with knee bent to lift upper leg.  Positive tenderness to palpation and motion to the medial aspect, mild swelling noted, no discoloration.  Distal motor and sensory intact, cap refill at 2 seconds.  Full ROM of distal joints with no sx aggravation            Image Results:  XR knee left 1-2 views  Narrative: STUDY:  Knee Radiographs; 5/31/2024, 12:15  INDICATION:  Pain.  COMPARISON:  None Available.  ACCESSION NUMBER(S):  [id number]  ORDERING CLINICIAN:  FRED GONZALEZ  TECHNIQUE:  2 view(s) of the left knee.  FINDINGS:    There is no displaced fracture.  The alignment is anatomic.  No soft  tissue abnormality is seen.  There is no joint effusion.  Impression: No acute osseous abnormality.  Signed by Winston Buckley MD    Independent review of knee images completed on today's visit.  There is no acute fracture, misalignment or degenerative changes noted.    Assessment/Plan   Encounter Diagnoses:  Problem List Items Addressed This Visit             ICD-10-CM    Knee strain, left, initial encounter - Primary S86.912A     Prescription for ibuprofen 600 mg provided and instructed to take 3 times daily for the next 3 days with food, then may decrease to as needed.  Patient may use Tylenol per package directions in addition for additional symptom control.  Patient to be fitted in a hinged knee brace to wear with weightbearing activities, may remove with rest and sleep  May continue crutches with partial weightbearing over the next 2 to 3 days and wean as tolerated  Instructed on frequent elevation, rest and icing as well as hourly ambulation while awake.  Plan to follow-up here in approximately 2 to 3 weeks, sooner for changes or concerns.  This note was generated using Dragon software.  It may contain errors in wording, punctuation or spelling.          Other Visit Diagnoses         Codes    Acute pain of left knee     M25.562     Relevant Medications    ibuprofen 600 mg tablet

## 2024-06-03 NOTE — ASSESSMENT & PLAN NOTE
Prescription for ibuprofen 600 mg provided and instructed to take 3 times daily for the next 3 days with food, then may decrease to as needed.  Patient may use Tylenol per package directions in addition for additional symptom control.  Patient to be fitted in a hinged knee brace to wear with weightbearing activities, may remove with rest and sleep  May continue crutches with partial weightbearing over the next 2 to 3 days and wean as tolerated  Instructed on frequent elevation, rest and icing as well as hourly ambulation while awake.  Plan to follow-up here in approximately 2 to 3 weeks, sooner for changes or concerns.  This note was generated using Dragon software.  It may contain errors in wording, punctuation or spelling.

## 2024-06-04 ENCOUNTER — APPOINTMENT (OUTPATIENT)
Dept: ORTHOPEDIC SURGERY | Facility: CLINIC | Age: 49
End: 2024-06-04
Payer: COMMERCIAL

## 2024-06-17 ENCOUNTER — APPOINTMENT (OUTPATIENT)
Dept: ORTHOPEDIC SURGERY | Facility: CLINIC | Age: 49
End: 2024-06-17
Payer: COMMERCIAL

## 2024-06-17 DIAGNOSIS — S86.912A KNEE STRAIN, LEFT, INITIAL ENCOUNTER: Primary | ICD-10-CM

## 2024-06-17 PROCEDURE — 99213 OFFICE O/P EST LOW 20 MIN: CPT | Performed by: NURSE PRACTITIONER

## 2024-06-17 ASSESSMENT — ENCOUNTER SYMPTOMS
CONSTITUTIONAL NEGATIVE: 1
ENDOCRINE NEGATIVE: 1
RESPIRATORY NEGATIVE: 1
PSYCHIATRIC NEGATIVE: 1
CARDIOVASCULAR NEGATIVE: 1
HEMATOLOGIC/LYMPHATIC NEGATIVE: 1
ARTHRALGIAS: 1
KNEE DEFORMITY: 1
NEUROLOGICAL NEGATIVE: 1

## 2024-06-17 ASSESSMENT — PAIN SCALES - GENERAL: PAINLEVEL_OUTOF10: 7

## 2024-06-17 ASSESSMENT — PAIN DESCRIPTION - DESCRIPTORS: DESCRIPTORS: DULL;ACHING

## 2024-06-17 ASSESSMENT — PAIN - FUNCTIONAL ASSESSMENT: PAIN_FUNCTIONAL_ASSESSMENT: 0-10

## 2024-06-17 NOTE — ASSESSMENT & PLAN NOTE
May continue with ibuprofen up to 3 times daily with food or snack, patient may use Tylenol per package directions.  Continue to wear hinged knee brace with weightbearing activities, remove with rest and sleep.  May DC use of crutches  Instructed on frequent elevation, rest and icing.  Gradually increase weightbearing activity as tolerated  PT referral for eval and treat, okay to wean out of brace as tolerated  Plan to follow-up here in approximately 4 weeks, sooner for changes or concerns.  This note was generated using Dragon software.  It may contain errors in wording, punctuation or spelling.

## 2024-06-17 NOTE — PROGRESS NOTES
"Subjective    Patient ID: Carolyne Mi is a 49 y.o. female.    Chief Complaint: Pain of the Left Knee (Pt has been wearing the knee brace and using crutches as needed.)     Mark Knee       Heather is a pleasant 48-year-old female presenting for FUV evaluation of left knee pain and popping sensation.  This occurred on 5/25/2024 when she was sitting up from lying down and twisted her knee while picking up a cat. Denies any prior injuries or surgeries to this knee.   Overall improvement from initial visit approximately 50%  Hinged brace helps some, crutches,\"not hardly at all\"  Ibuprofen about once daily helps some  Home stretching and exercises, no further popping      Review of Systems   Constitutional: Negative.    HENT: Negative.     Respiratory: Negative.     Cardiovascular: Negative.    Endocrine: Negative.    Musculoskeletal:  Positive for arthralgias.   Skin: Negative.    Neurological: Negative.    Hematological: Negative.    Psychiatric/Behavioral: Negative.          Objective   Right Knee Exam   Right knee exam is normal.      Left Knee Exam     Tenderness   The patient is experiencing tenderness in the medial joint line.    Range of Motion   Extension:  0   Flexion:  110     Tests   Edgar:  Medial - negative Lateral - negative  Varus: negative Valgus: negative  Lachman:  Anterior - negative      Drawer:  Anterior - negative     Posterior - negative    Other   Erythema: absent  Sensation: normal  Pulse: present  Swelling: mild  Effusion: no effusion present    Comments:  No instability noted, symptoms aggravated with medial Edgar and medial Apley's.  Positive tenderness to palpation and motion to the medial aspect, mild swelling noted, no discoloration.  Distal motor and sensory intact, cap refill at 2 seconds.  Full ROM of distal joints with no sx aggravation.  Ambulates up and down the maynard and hinged knee brace with slight left-sided limp          Image Results:  XR knee left 1-2 views  Narrative: " STUDY:  Knee Radiographs; 5/31/2024, 12:15  INDICATION:  Pain.  COMPARISON:  None Available.  ACCESSION NUMBER(S):  [id number]  ORDERING CLINICIAN:  FRED GONZALEZ  TECHNIQUE:  2 view(s) of the left knee.  FINDINGS:    There is no displaced fracture.  The alignment is anatomic.  No soft  tissue abnormality is seen.  There is no joint effusion.  Impression: No acute osseous abnormality.  Signed by Winston Buckley MD      Assessment/Plan   Encounter Diagnoses:  Problem List Items Addressed This Visit             ICD-10-CM    Knee strain, left, initial encounter S86.912A     May continue with ibuprofen up to 3 times daily with food or snack, patient may use Tylenol per package directions.  Continue to wear hinged knee brace with weightbearing activities, remove with rest and sleep.  May DC use of crutches  Instructed on frequent elevation, rest and icing.  Gradually increase weightbearing activity as tolerated  PT referral for eval and treat, okay to wean out of brace as tolerated  Plan to follow-up here in approximately 4 weeks, sooner for changes or concerns.  This note was generated using Dragon software.  It may contain errors in wording, punctuation or spelling.         Relevant Orders    Referral to Physical Therapy

## 2024-06-20 ENCOUNTER — EVALUATION (OUTPATIENT)
Dept: PHYSICAL THERAPY | Facility: CLINIC | Age: 49
End: 2024-06-20
Payer: COMMERCIAL

## 2024-06-20 DIAGNOSIS — S86.912A KNEE STRAIN, LEFT, INITIAL ENCOUNTER: ICD-10-CM

## 2024-06-20 DIAGNOSIS — M25.562 LEFT MEDIAL KNEE PAIN: Primary | ICD-10-CM

## 2024-06-20 PROCEDURE — 97161 PT EVAL LOW COMPLEX 20 MIN: CPT | Mod: GP | Performed by: PHYSICAL THERAPIST

## 2024-06-20 PROCEDURE — 97110 THERAPEUTIC EXERCISES: CPT | Mod: GP | Performed by: PHYSICAL THERAPIST

## 2024-06-20 ASSESSMENT — PAIN SCALES - GENERAL: PAINLEVEL_OUTOF10: 7

## 2024-06-20 ASSESSMENT — ENCOUNTER SYMPTOMS
OCCASIONAL FEELINGS OF UNSTEADINESS: 0
DEPRESSION: 0
LOSS OF SENSATION IN FEET: 0

## 2024-06-20 ASSESSMENT — PAIN DESCRIPTION - DESCRIPTORS: DESCRIPTORS: DULL;ACHING

## 2024-06-20 ASSESSMENT — PAIN - FUNCTIONAL ASSESSMENT: PAIN_FUNCTIONAL_ASSESSMENT: 0-10

## 2024-06-20 NOTE — PROGRESS NOTES
"Physical Therapy    Physical Therapy Lower Extremity Evaluation    Patient Name: Carolyne Mi  MRN: [id number]  Today's Date: 2024  Time Calculation  Start Time: 1334  Stop Time: 1406  Time Calculation (min): 32 min  PT Evaluation Time Entry  PT Evaluation (Low) Time Entry: 14  PT Therapeutic Procedures Time Entry  Therapeutic Exercise Time Entry: 16                   Current Problem  Problem List Items Addressed This Visit             ICD-10-CM    Knee strain, left, initial encounter S86.912A    Relevant Orders    Follow Up In Physical Therapy    Left medial knee pain - Primary M25.562    Relevant Orders    Follow Up In Physical Therapy          SUBJECTIVE  Subjective   Patient reports she tried to get cat out from under car, reached down and grabbed cat and when she stood back up it \"popped\" and pain in (medial) side of knee.  IS wearing a brace currently.  Patient states it has gotten a \"little bit\" better but still can't straighten it.  States she has been trying to do some exercises herself at home but is still having trouble.  Denies any N&T in Les.  Denies pain any where other than knee.  Had xray and it was ok    General  Reason for Referral: knee strain  Referred By: Ok  Precautions  Precautions  STEADI Fall Risk Score (The score of 4 or more indicates an increased risk of falling): 0  Medical Precautions: No known precautions/limitation       Pain  Pain Assessment: 0-10  0-10 (Numeric) Pain Score: 7  Pain Location: Knee  Pain Orientation: Left  Pain Descriptors: Dull, Aching  Was icing it but no longer, sometimes ibuprofen helps, sometimes Aspercreme    SUBJECTIVE:   Patient verified by name and .  Chief complaint:  L knee pain, unable to fully straighten      Imaging: xra y WNL    Prior level of function:    Painfree, WNL AROM    Current limitations:   Difficulty negotiating stairs, difficulty walking, unable straighten knee    Home setup:   Lives in basement of relative so has to negotiate " "steps regularly    Work: n/a    Patients goal: painfree, full ROM, normal walking    Prior tx: none    OBJECTIVE:    Lower Extremity Strength: R=WNL, L hip WNL, L knee 3-/5    Lower Extremity ROM: WNL unless documented below:  ROM in Degrees  RIGHT LEFT   Hip Flexion     Hip Extension     Hip Abduction     Hip Adduction     Knee Extension 0 Lacking 10 deg   Knee Flexion 130 125   Ankle DF     Ankle PF     Ankle INV     Ankle EV        L knee  Joint mobility WNL   SLS (level):   R=15 sec plus no sway or LOB;  L=3 sec significant sway      Gait mechanics: antalgic, flexed L knee, walks on tiptoes  Palpation significant tenderness medial knee at tendon insertion (negative along patella, negative joint line on medial)    Neurological symptoms none  Special tests:     Outcome Measure  Other Measures  Lower Extremity Funtional Score (LEFS): 13        KNEE RIGHT LEFT   Edgar's   neg   Apley's     Joint Line Tenderness RIGHT LEFT   Anterior Drawer   Neg    Posterior Drawer   neg   Valgus stress   neg   Varus Stress   neg         TREATMENT:  Standing calf stretch 30\" and HEP  Seated hamstring stretch 30\" and HEP  SLS level surface 30\" and HEP  Ice massage medial knee L completed and instructed for HEP    Pulsed US 50% 3.3W/cm2 to medial knee at distal tendon insertion A    Manual: stretching L knee (emphasis on extension) A    Gait training:  Heel strike on initial contact, push off toes    OP EDUCATION:  HEP:  Access Code: [email or web address]  URL: [email or web address]  Date: 06/20/2024  Prepared by: Calista Moreno    Exercises  - Gastroc Stretch on Wall  - 2 x daily - 7 x weekly - 2 sets - 2 reps - 30 sec hold  - Seated Hamstring Stretch with Chair  - 2 x daily - 7 x weekly - 2 sets - 2 reps - 30 sec hold  - Standing Single Leg Stance with Counter Support  - 2 x daily - 7 x weekly - 2 sets - 2 reps - 15 sec hold    ASSESSEMENT  Pt is a 49 y.o. referred to physical therapy for a dx of L knee pain, difficulty " walking, and L knee strain by Sri Euceda APRN* . Pt presents with impairments of  decreased AROM, strength, balance, gait.  Integrity of L knee is good.  PT Assessment Results: Decreased strength, Decreased range of motion, Decreased endurance, Impaired balance, Decreased mobility, Pain  Rehab Prognosis: Good  Assessment Comment: ROM improved following stretching today. This pt would benefit from a therapy program to restore prior level of function, reduce pain, increase AROM, increase strength, and improve gait and balance.     The physical therapy prognosis is good for the patient to achieve their goals.   The pt tolerated therapy treatment today well with no adverse effects.  Barriers to therapy include:  none    PLAN  PT Frequency: 2 times per week  Duration: 7 visits ((need insurance auth))      The pt has been educated about the risks and benefits of physical therapy including manual therapy treatments and gives consent for treatment.     The patient will benefit from physical therapy treatment to include: Treatment/Interventions: Cryotherapy, Education/ Instruction, Manual therapy, Neuromuscular re-education, Self care/ home management, Therapeutic exercises, Ultrasound       Goals:  Active       PT Problem       PT Goal 1       Start:  06/20/24    Expected End:  07/20/24       Patient will have 0-130 deg AROM L knee without discomfort to improve mobility...  3 weeks    Patient will perform SLS on level surface LLE 15 sec 2/3 trials with min to no sway/steady to improve mobility   3 weeks    Patient will report 2/10 pain or less with ambulation and exercise    3 weeks    Patient will be independent with HEP to improve Rom, strength, proprioception/balance and mobility....  3 weeks    LEFS score of 60/80 or better in 1 month

## 2024-06-24 ENCOUNTER — TREATMENT (OUTPATIENT)
Dept: PHYSICAL THERAPY | Facility: CLINIC | Age: 49
End: 2024-06-24
Payer: COMMERCIAL

## 2024-06-24 ENCOUNTER — APPOINTMENT (OUTPATIENT)
Dept: PHYSICAL THERAPY | Facility: CLINIC | Age: 49
End: 2024-06-24
Payer: COMMERCIAL

## 2024-06-24 DIAGNOSIS — M25.562 LEFT MEDIAL KNEE PAIN: ICD-10-CM

## 2024-06-24 DIAGNOSIS — S86.912A KNEE STRAIN, LEFT, INITIAL ENCOUNTER: ICD-10-CM

## 2024-06-24 PROCEDURE — 97110 THERAPEUTIC EXERCISES: CPT | Mod: GP,CQ

## 2024-06-24 PROCEDURE — 97035 APP MDLTY 1+ULTRASOUND EA 15: CPT | Mod: GP,CQ

## 2024-06-24 ASSESSMENT — PAIN - FUNCTIONAL ASSESSMENT: PAIN_FUNCTIONAL_ASSESSMENT: 0-10

## 2024-06-24 ASSESSMENT — PAIN SCALES - GENERAL: PAINLEVEL_OUTOF10: 7

## 2024-06-24 NOTE — PROGRESS NOTES
"Physical Therapy Treatment    Patient Name: Carolyne Mi  MRN: [id number]  Today's Date: 6/24/2024  Time Calculation  Start Time: 0915  Stop Time: 0955  Time Calculation (min): 40 min  PT Therapeutic Procedures Time Entry  Therapeutic Exercise Time Entry: 30  PT Modalities Time Entry  Ultrasound Time Entry: 8    Assessment:   Patient responds well to ultrasound this date. Patient challenged with step ups this date, but no c/o increased symptoms. Demo's improvements pre and post session.     Plan:  Continue to work on improving strength and ROM to be able to tolerate prolonged ambulation with little to no difficulty. -AB.     OP PT Plan  Treatment/Interventions: Cryotherapy, Education/ Instruction, Manual therapy, Neuromuscular re-education, Self care/ home management, Therapeutic exercises, Ultrasound  PT Plan: Skilled PT  PT Frequency: 2 times per week  Duration: 7 visits ((need insurance auth))  Rehab Potential: Good  Plan of Care Agreement: Patient    Current Problem  Problem List Items Addressed This Visit             ICD-10-CM    Knee strain, left, initial encounter S86.912A    Left medial knee pain M25.562       Subjective   General  Reason for Referral: knee strain  Referred By: Ok  HEP: Yes  Patient states that she is doing her HEP at home. States that she feels a little sore right now.     Precautions  Precautions  Medical Precautions: No known precautions/limitation    Pain  Pain Assessment: 0-10  0-10 (Numeric) Pain Score: 7  Pain Location: Knee  Pain Orientation: Left    Objective     Treatments:  Therapeutic Exercise: 30 minutes, 2 units  SciFit x6' (N)  Slantboard 2x1' (N)  Standing calf stretch 30\" and HEP  Seated hamstring stretch 3x30\"   SLS level surface 3x30\"   Step ups 6\" step Fwd/Lateral 2x10 L leading (N)  Ice massage medial knee L completed and instructed for HEP      Modalities: 8  minutes, 1 units  Pulsed US 50% 3.3W/cm2 to medial knee at distal tendon insertion (N)    Manual: stretching " L knee (emphasis on extension) A    OP EDUCATION:   HEP:  Access Code: [email or web address]  URL: [email or web address]  Date: 06/20/2024  Prepared by: Calista Moreno    Exercises  - Gastroc Stretch on Wall  - 2 x daily - 7 x weekly - 2 sets - 2 reps - 30 sec hold  - Seated Hamstring Stretch with Chair  - 2 x daily - 7 x weekly - 2 sets - 2 reps - 30 sec hold  - Standing Single Leg Stance with Counter Support  - 2 x daily - 7 x weekly - 2 sets - 2 reps - 15 sec hold    Goals:  Active       PT Problem       PT Goal 1       Start:  06/20/24    Expected End:  07/20/24       Patient will have 0-130 deg AROM L knee without discomfort to improve mobility...  3 weeks    Patient will perform SLS on level surface LLE 15 sec 2/3 trials with min to no sway/steady to improve mobility   3 weeks    Patient will report 2/10 pain or less with ambulation and exercise    3 weeks    Patient will be independent with HEP to improve Rom, strength, proprioception/balance and mobility....  3 weeks    LEFS score of 60/80 or better in 1 month

## 2024-06-26 ENCOUNTER — TREATMENT (OUTPATIENT)
Dept: PHYSICAL THERAPY | Facility: CLINIC | Age: 49
End: 2024-06-26
Payer: COMMERCIAL

## 2024-06-26 DIAGNOSIS — M25.562 LEFT MEDIAL KNEE PAIN: ICD-10-CM

## 2024-06-26 DIAGNOSIS — S86.912A KNEE STRAIN, LEFT, INITIAL ENCOUNTER: ICD-10-CM

## 2024-06-26 PROCEDURE — 97140 MANUAL THERAPY 1/> REGIONS: CPT | Mod: GP,CQ

## 2024-06-26 PROCEDURE — 97035 APP MDLTY 1+ULTRASOUND EA 15: CPT | Mod: GP,CQ

## 2024-06-26 PROCEDURE — 97110 THERAPEUTIC EXERCISES: CPT | Mod: GP,CQ

## 2024-06-26 ASSESSMENT — PAIN - FUNCTIONAL ASSESSMENT: PAIN_FUNCTIONAL_ASSESSMENT: 0-10

## 2024-06-26 ASSESSMENT — PAIN SCALES - GENERAL: PAINLEVEL_OUTOF10: 6

## 2024-06-26 NOTE — PROGRESS NOTES
"Physical Therapy Treatment    Patient Name: Carolyne Mi  MRN: [id number]  Today's Date: 6/26/2024  Time Calculation  Start Time: 1314  Stop Time: 1357  Time Calculation (min): 43 min  PT Therapeutic Procedures Time Entry  Manual Therapy Time Entry: 11  Therapeutic Exercise Time Entry: 22  PT Modalities Time Entry  Ultrasound Time Entry: 8           Current Problem  Problem List Items Addressed This Visit             ICD-10-CM       Musculoskeletal and Injuries    Left medial knee pain M25.562       Other    Knee strain, left, initial encounter S86.912A         Subjective She reports difficulty getting knee to extend after sitting long time frames.   General  Reason for Referral: knee strain  Referred By: Ok  General Comment: 3/7 POC  Precautions  Precautions  Medical Precautions: No known precautions/limitation  Pain  Pain Assessment: 0-10  0-10 (Numeric) Pain Score: 6  Pain Location: Knee  Pain Orientation: Left  Objective:    Assessment: Patient identified by name and birth date. IASTM/STM completed to reduce pain and soft tissue restriction in L knee musculature. Noted after manual therapy she demo'd improved transitional movements. Completed all CKC exercises  with minimal c/o.     Plan: Continue with manual therapy to reduce soft tissue restriction / pain and strengthening to allow  improved ease with functional transfers and standing ADLs.-CG.       OP PT Plan  Treatment/Interventions: Cryotherapy, Education/ Instruction, Manual therapy, Neuromuscular re-education, Self care/ home management, Therapeutic exercises, Ultrasound  PT Plan: Skilled PT  PT Frequency: 2 times per week  Duration: 7 visits ((need insurance auth))  Rehab Potential: Good  Plan of Care Agreement: Patient          Treatments:  Therapeutic Exercise: 30 minutes, 2 units  SciFit x6'   Slantboard 2x1'   Standing calf stretch 30\"   Seated hamstring stretch 3x30\"   SLS level surface 3x30\"   Step ups 6\" step Fwd/Lateral 2x10 L leading      "      Modalities: 8  minutes, 1 units  Pulsed US 50% 3.3W/cm2 to medial knee at distal tendon insertion (N)     Manual: stretching L knee (emphasis on extension) A          OP EDUCATION:     Access Code: [email or web address]  URL: [email or web address]  Date: 06/20/2024  Prepared by: Calista Moreno     Exercises  - Gastroc Stretch on Wall  - 2 x daily - 7 x weekly - 2 sets - 2 reps - 30 sec hold  - Seated Hamstring Stretch with Chair  - 2 x daily - 7 x weekly - 2 sets - 2 reps - 30 sec hold  - Standing Single Leg Stance with Counter Support  - 2 x daily - 7 x weekly - 2 sets - 2 reps - 15 sec hold  Goals:  Active       PT Problem       PT Goal 1       Start:  06/20/24    Expected End:  07/20/24       Patient will have 0-130 deg AROM L knee without discomfort to improve mobility...  3 weeks    Patient will perform SLS on level surface LLE 15 sec 2/3 trials with min to no sway/steady to improve mobility   3 weeks    Patient will report 2/10 pain or less with ambulation and exercise    3 weeks    Patient will be independent with HEP to improve Rom, strength, proprioception/balance and mobility....  3 weeks    LEFS score of 60/80 or better in 1 month

## 2024-07-15 ENCOUNTER — APPOINTMENT (OUTPATIENT)
Dept: ORTHOPEDIC SURGERY | Facility: CLINIC | Age: 49
End: 2024-07-15
Payer: COMMERCIAL

## 2024-07-15 DIAGNOSIS — S86.912A KNEE STRAIN, LEFT, INITIAL ENCOUNTER: Primary | ICD-10-CM

## 2024-07-15 PROCEDURE — 99213 OFFICE O/P EST LOW 20 MIN: CPT | Performed by: NURSE PRACTITIONER

## 2024-07-15 ASSESSMENT — ENCOUNTER SYMPTOMS
NEUROLOGICAL NEGATIVE: 1
CARDIOVASCULAR NEGATIVE: 1
PSYCHIATRIC NEGATIVE: 1
ENDOCRINE NEGATIVE: 1
KNEE DEFORMITY: 1
HEMATOLOGIC/LYMPHATIC NEGATIVE: 1
CONSTITUTIONAL NEGATIVE: 1
RESPIRATORY NEGATIVE: 1

## 2024-07-15 ASSESSMENT — PAIN - FUNCTIONAL ASSESSMENT: PAIN_FUNCTIONAL_ASSESSMENT: NO/DENIES PAIN

## 2024-07-15 NOTE — PROGRESS NOTES
Subjective    Patient ID: Carolyne Mi is a 49 y.o. female.    Chief Complaint: Pain of the Left Knee (Pt has been wearing the knee brace as needed, no longer needs to use crutches.)     Left Saurabh Romero is a pleasant 48-year-old female presenting for FUV evaluation of left knee pain/.  This occurred on 5/25/2024 when she was sitting up from lying down and twisted her knee while picking up a cat. Denies any prior injuries or surgeries to this knee.   patient reports resolution of symptoms.  No pain, resuming activities and tolerating well, no meds for symptoms, no aggravating factors identified.  Patient wears hinged knee brace with some weightbearing activities, feels ready to come out of the bracing  PT eval and 2 sessions, doing some home stretches    Review of Systems   Constitutional: Negative.    HENT: Negative.     Respiratory: Negative.     Cardiovascular: Negative.    Endocrine: Negative.    Skin: Negative.    Neurological: Negative.    Hematological: Negative.    Psychiatric/Behavioral: Negative.          Objective   Right Knee Exam   Right knee exam is normal.      Left Knee Exam     Tenderness   Left knee tenderness location: Resolved.    Range of Motion   Extension:  0   Flexion:  130     Tests   Edgar:  Medial - negative Lateral - negative  Varus: negative Valgus: negative  Lachman:  Anterior - negative      Drawer:  Anterior - negative     Posterior - negative    Other   Erythema: absent  Sensation: normal  Pulse: present  Swelling: mild  Effusion: no effusion present    Comments:  No instability, no symptomatic aggravation with full range of motion and medial knee testing.  Distal motor and sensory intact, cap refill at 2 seconds.  Full ROM of distal joints with no sx aggravation.  Ambulates with steady gait, no limp          Image Results:  XR knee left 1-2 views  Narrative: STUDY:  Knee Radiographs; 5/31/2024, 12:15  INDICATION:  Pain.  COMPARISON:  None Available.  ACCESSION  NUMBER(S):  [id number]  ORDERING CLINICIAN:  FRED GONZALEZ  TECHNIQUE:  2 view(s) of the left knee.  FINDINGS:    There is no displaced fracture.  The alignment is anatomic.  No soft  tissue abnormality is seen.  There is no joint effusion.  Impression: No acute osseous abnormality.  Signed by Winston Buckley MD    Reviewed PT notes from eval and session x 2 on date of visit    Assessment/Plan   Encounter Diagnoses:  Problem List Items Addressed This Visit             ICD-10-CM    Knee strain, left, initial encounter - Primary S86.912A     We discussed discontinuing knee brace with most activity.  Advised to take and have available for extended walking, uneven surfaces and hills especially over the next 3 to 4 weeks.  Home knee dissing exercises provided from [employer] to begin 3 times weekly, bands for light, medium and heavy were provided to add every 2 weeks for resistance training.  Reviewed symptom control measures including rest, ice and ibuprofen as needed.  Plan will be to follow-up as needed for any changes or concerns.  This note was generated using Dragon software.  It may contain errors in wording, punctuation or spelling.         Relevant Orders    Follow Up In Orthopaedic Surgery

## 2024-07-15 NOTE — ASSESSMENT & PLAN NOTE
We discussed discontinuing knee brace with most activity.  Advised to take and have available for extended walking, uneven surfaces and hills especially over the next 3 to 4 weeks.  Home knee dissing exercises provided from [employer] to begin 3 times weekly, bands for light, medium and heavy were provided to add every 2 weeks for resistance training.  Reviewed symptom control measures including rest, ice and ibuprofen as needed.  Plan will be to follow-up as needed for any changes or concerns.  This note was generated using Dragon software.  It may contain errors in wording, punctuation or spelling.

## 2024-08-13 DIAGNOSIS — I10 PRIMARY HYPERTENSION: ICD-10-CM

## 2024-08-13 RX ORDER — LISINOPRIL 10 MG/1
10 TABLET ORAL DAILY
Qty: 90 TABLET | Refills: 1 | Status: SHIPPED | OUTPATIENT
Start: 2024-08-13 | End: 2025-02-09

## 2024-09-23 ENCOUNTER — DOCUMENTATION WITH CHARGES (OUTPATIENT)
Dept: PHYSICAL THERAPY | Facility: CLINIC | Age: 49
End: 2024-09-23
Payer: COMMERCIAL

## 2024-09-23 NOTE — PROGRESS NOTES
Physical Therapy    Discharge Summary    Name: Carolyne Mi  MRN: [id number]  : 1975  Date: 24    Discharge Summary: PT    Discharge Information: Date of discharge 24, Date of last visit 24, Date of evaluation 24, Number of attended visits 3, Referred by Nancy, and Referred for L knee pain    Therapy Summary: patient made minimal to no progress with intervention as she only attended eval and 2 additional sessions.  No goals met    Discharge Status: discharged     Rehab Discharge Reason: Failed to schedule and/or keep follow-up appointment(s)

## 2024-09-25 NOTE — PROGRESS NOTES
Subjective   Carolyne Mi is a 48 y.o. female who presents for No chief complaint on file..  Here c/o worsening heartburn.  Her heartburn started a while ago.  We started omeprazole at her last visit last week.  She feels that the heartburn is actually worse.  She admits that she is not watching her diet real close, has tried sleeping with extra pillows, has been taking some tums/rolaids.              Objective   Visit Vitals  /82 (BP Location: Left arm, Patient Position: Sitting, BP Cuff Size: Adult)   Pulse 97      Physical Exam  Vitals reviewed.   Constitutional:       General: She is not in acute distress.  Cardiovascular:      Rate and Rhythm: Normal rate and regular rhythm.      Heart sounds: No murmur heard.  Pulmonary:      Effort: Pulmonary effort is normal. No respiratory distress.      Breath sounds: Normal breath sounds.   Skin:     General: Skin is warm and dry.   Neurological:      General: No focal deficit present.      Mental Status: She is alert. Mental status is at baseline.         Assessment/Plan   Problem List Items Addressed This Visit    None  Visit Diagnoses       Gastroesophageal reflux disease, unspecified whether esophagitis present    -  Primary    Relevant Medications    pantoprazole (ProtoNix) 40 mg EC tablet    famotidine (Pepcid) 20 mg tablet    Vitamin B12 deficiency        Relevant Medications    cyanocobalamin (Vitamin B-12) 1,000 mcg tablet               Mitra Cruz MD    Monitor BP  -on home Clonidine 1mg QD; Bisoprolol 5mg QD (Metoprolol XL50mg QD) Doxazosin 4mg QD, Norvasc 5mg QD, Imdur Mono 60mg QD;

## 2024-10-21 ENCOUNTER — HOSPITAL ENCOUNTER (OUTPATIENT)
Dept: RADIOLOGY | Facility: HOSPITAL | Age: 49
Discharge: HOME | End: 2024-10-21
Payer: COMMERCIAL

## 2024-10-21 VITALS — WEIGHT: 152 LBS | BODY MASS INDEX: 23.86 KG/M2 | HEIGHT: 67 IN

## 2024-10-21 DIAGNOSIS — Z12.31 ENCOUNTER FOR SCREENING MAMMOGRAM FOR BREAST CANCER: ICD-10-CM

## 2024-10-21 PROCEDURE — 77067 SCR MAMMO BI INCL CAD: CPT | Performed by: RADIOLOGY

## 2024-10-21 PROCEDURE — 77063 BREAST TOMOSYNTHESIS BI: CPT | Performed by: RADIOLOGY

## 2024-10-21 PROCEDURE — 77067 SCR MAMMO BI INCL CAD: CPT

## 2024-10-24 ENCOUNTER — APPOINTMENT (OUTPATIENT)
Dept: PRIMARY CARE | Facility: CLINIC | Age: 49
End: 2024-10-24
Payer: COMMERCIAL

## 2024-10-24 VITALS
DIASTOLIC BLOOD PRESSURE: 66 MMHG | HEIGHT: 67 IN | OXYGEN SATURATION: 98 % | WEIGHT: 159.9 LBS | BODY MASS INDEX: 25.1 KG/M2 | HEART RATE: 78 BPM | SYSTOLIC BLOOD PRESSURE: 118 MMHG

## 2024-10-24 DIAGNOSIS — E53.8 VITAMIN B12 DEFICIENCY: ICD-10-CM

## 2024-10-24 DIAGNOSIS — Z23 IMMUNIZATION DUE: ICD-10-CM

## 2024-10-24 DIAGNOSIS — K21.9 GASTROESOPHAGEAL REFLUX DISEASE, UNSPECIFIED WHETHER ESOPHAGITIS PRESENT: ICD-10-CM

## 2024-10-24 DIAGNOSIS — I10 PRIMARY HYPERTENSION: Primary | ICD-10-CM

## 2024-10-24 PROCEDURE — 3074F SYST BP LT 130 MM HG: CPT | Performed by: FAMILY MEDICINE

## 2024-10-24 PROCEDURE — 90471 IMMUNIZATION ADMIN: CPT | Performed by: FAMILY MEDICINE

## 2024-10-24 PROCEDURE — 90673 RIV3 VACCINE NO PRESERV IM: CPT | Performed by: FAMILY MEDICINE

## 2024-10-24 PROCEDURE — 3008F BODY MASS INDEX DOCD: CPT | Performed by: FAMILY MEDICINE

## 2024-10-24 PROCEDURE — 99214 OFFICE O/P EST MOD 30 MIN: CPT | Performed by: FAMILY MEDICINE

## 2024-10-24 PROCEDURE — 3078F DIAST BP <80 MM HG: CPT | Performed by: FAMILY MEDICINE

## 2024-10-24 RX ORDER — LISINOPRIL 10 MG/1
10 TABLET ORAL DAILY
Qty: 90 TABLET | Refills: 1 | Status: SHIPPED | OUTPATIENT
Start: 2024-10-24 | End: 2025-04-22

## 2024-10-24 RX ORDER — PANTOPRAZOLE SODIUM 40 MG/1
40 TABLET, DELAYED RELEASE ORAL DAILY
Qty: 90 TABLET | Refills: 1 | Status: SHIPPED | OUTPATIENT
Start: 2024-10-24 | End: 2025-04-22

## 2024-10-24 RX ORDER — FAMOTIDINE 20 MG/1
20 TABLET, FILM COATED ORAL 2 TIMES DAILY
Qty: 180 TABLET | Refills: 1 | Status: SHIPPED | OUTPATIENT
Start: 2024-10-24 | End: 2025-04-22

## 2024-10-24 RX ORDER — SUCRALFATE 1 G/1
1 TABLET ORAL
Qty: 360 TABLET | Refills: 1 | Status: SHIPPED | OUTPATIENT
Start: 2024-10-24 | End: 2025-04-22

## 2024-10-24 NOTE — PROGRESS NOTES
Subjective   Carolyne Mi is a 49 y.o. female who presents for No chief complaint on file..  Here for routine follow up GERD/gastritis, HTN, vitamin B12 def.  Overall she is doing well.  She did recently rip out her belly button ring, has two raised areas there but no pain or redness/drainage.              Objective   Visit Vitals  /66 (BP Location: Left arm, Patient Position: Sitting, BP Cuff Size: Adult)   Pulse 78      Physical Exam  Vitals reviewed.   Constitutional:       General: She is not in acute distress.  Cardiovascular:      Rate and Rhythm: Normal rate and regular rhythm.      Heart sounds: No murmur heard.  Pulmonary:      Effort: Pulmonary effort is normal. No respiratory distress.      Breath sounds: Normal breath sounds.   Skin:     General: Skin is warm and dry.      Comments: There are two little red bumps where her belly button ring was, no evidence of infection.   Neurological:      General: No focal deficit present.      Mental Status: She is alert. Mental status is at baseline.         Assessment/Plan   Problem List Items Addressed This Visit       Gastroesophageal reflux disease    Relevant Medications    famotidine (Pepcid) 20 mg tablet    pantoprazole (ProtoNix) 40 mg EC tablet    sucralfate (Carafate) 1 gram tablet    Other Relevant Orders    CBC and Auto Differential    Comprehensive Metabolic Panel    Lipid Panel    TSH with reflex to Free T4 if abnormal    Vitamin B12    Primary hypertension - Primary    Relevant Medications    lisinopril 10 mg tablet    Other Relevant Orders    CBC and Auto Differential    Comprehensive Metabolic Panel    Lipid Panel    TSH with reflex to Free T4 if abnormal    Vitamin B12    Vitamin B12 deficiency    Relevant Orders    CBC and Auto Differential    Comprehensive Metabolic Panel    Lipid Panel    TSH with reflex to Free T4 if abnormal    Vitamin B12     Other Visit Diagnoses       Immunization due        Relevant Orders    Flu vaccine,  trivalent, preservative free, no egg protein, age 18y+ (Flublok)               Mitra Cruz MD

## 2024-10-24 NOTE — PATIENT INSTRUCTIONS
Continue current medications.  She will monitor her belly button site for any signs of infection or pain.  Follow up with specialists as scheduled.  Follow up in 6 months, sooner if needed.

## 2024-10-24 NOTE — PROGRESS NOTES
Subjective   Patient ID: Carolyne Mi is a 49 y.o. female who presents for 6 month check up. Patients belly button ring got ripped out and now she is having issues with it.     HPI     Review of Systems    Objective   There were no vitals taken for this visit.    Physical Exam    Assessment/Plan

## 2025-01-06 DIAGNOSIS — E53.8 VITAMIN B12 DEFICIENCY: ICD-10-CM

## 2025-03-25 ENCOUNTER — APPOINTMENT (OUTPATIENT)
Dept: OBSTETRICS AND GYNECOLOGY | Facility: CLINIC | Age: 50
End: 2025-03-25

## 2025-04-09 LAB
ALBUMIN SERPL-MCNC: 4.7 G/DL (ref 3.6–5.1)
ALP SERPL-CCNC: 43 U/L (ref 31–125)
ALT SERPL-CCNC: 12 U/L (ref 6–29)
ANION GAP SERPL CALCULATED.4IONS-SCNC: 7 MMOL/L (CALC) (ref 7–17)
AST SERPL-CCNC: 13 U/L (ref 10–35)
BASOPHILS # BLD AUTO: 72 CELLS/UL (ref 0–200)
BASOPHILS NFR BLD AUTO: 0.7 %
BILIRUB SERPL-MCNC: 0.3 MG/DL (ref 0.2–1.2)
BUN SERPL-MCNC: 10 MG/DL (ref 7–25)
CALCIUM SERPL-MCNC: 9.6 MG/DL (ref 8.6–10.2)
CHLORIDE SERPL-SCNC: 107 MMOL/L (ref 98–110)
CHOLEST SERPL-MCNC: 199 MG/DL
CHOLEST/HDLC SERPL: 5 (CALC)
CO2 SERPL-SCNC: 25 MMOL/L (ref 20–32)
CREAT SERPL-MCNC: 0.85 MG/DL (ref 0.5–0.99)
EGFRCR SERPLBLD CKD-EPI 2021: 85 ML/MIN/1.73M2
EOSINOPHIL # BLD AUTO: 155 CELLS/UL (ref 15–500)
EOSINOPHIL NFR BLD AUTO: 1.5 %
ERYTHROCYTE [DISTWIDTH] IN BLOOD BY AUTOMATED COUNT: 14.4 % (ref 11–15)
GLUCOSE SERPL-MCNC: 89 MG/DL (ref 65–99)
HCT VFR BLD AUTO: 47.4 % (ref 35–45)
HDLC SERPL-MCNC: 40 MG/DL
HGB BLD-MCNC: 15.2 G/DL (ref 11.7–15.5)
LDLC SERPL CALC-MCNC: 122 MG/DL (CALC)
LYMPHOCYTES # BLD AUTO: 4244 CELLS/UL (ref 850–3900)
LYMPHOCYTES NFR BLD AUTO: 41.2 %
MCH RBC QN AUTO: 27.8 PG (ref 27–33)
MCHC RBC AUTO-ENTMCNC: 32.1 G/DL (ref 32–36)
MCV RBC AUTO: 86.7 FL (ref 80–100)
MONOCYTES # BLD AUTO: 876 CELLS/UL (ref 200–950)
MONOCYTES NFR BLD AUTO: 8.5 %
NEUTROPHILS # BLD AUTO: 4954 CELLS/UL (ref 1500–7800)
NEUTROPHILS NFR BLD AUTO: 48.1 %
NONHDLC SERPL-MCNC: 159 MG/DL (CALC)
PLATELET # BLD AUTO: 157 THOUSAND/UL (ref 140–400)
PMV BLD REES-ECKER: 12.7 FL (ref 7.5–12.5)
POTASSIUM SERPL-SCNC: 4.5 MMOL/L (ref 3.5–5.3)
PROT SERPL-MCNC: 7.4 G/DL (ref 6.1–8.1)
RBC # BLD AUTO: 5.47 MILLION/UL (ref 3.8–5.1)
SODIUM SERPL-SCNC: 139 MMOL/L (ref 135–146)
TRIGL SERPL-MCNC: 244 MG/DL
TSH SERPL-ACNC: 2.32 MIU/L
VIT B12 SERPL-MCNC: 772 PG/ML (ref 200–1100)
WBC # BLD AUTO: 10.3 THOUSAND/UL (ref 3.8–10.8)

## 2025-04-24 ENCOUNTER — APPOINTMENT (OUTPATIENT)
Age: 50
End: 2025-04-24
Payer: COMMERCIAL

## 2025-04-24 VITALS
HEART RATE: 86 BPM | WEIGHT: 158 LBS | BODY MASS INDEX: 24.75 KG/M2 | OXYGEN SATURATION: 97 % | SYSTOLIC BLOOD PRESSURE: 110 MMHG | DIASTOLIC BLOOD PRESSURE: 78 MMHG

## 2025-04-24 DIAGNOSIS — Z12.11 SCREENING FOR COLON CANCER: Primary | ICD-10-CM

## 2025-04-24 DIAGNOSIS — E53.8 VITAMIN B12 DEFICIENCY: ICD-10-CM

## 2025-04-24 DIAGNOSIS — K21.9 GASTROESOPHAGEAL REFLUX DISEASE, UNSPECIFIED WHETHER ESOPHAGITIS PRESENT: ICD-10-CM

## 2025-04-24 DIAGNOSIS — I10 PRIMARY HYPERTENSION: ICD-10-CM

## 2025-04-24 PROCEDURE — 3078F DIAST BP <80 MM HG: CPT | Performed by: FAMILY MEDICINE

## 2025-04-24 PROCEDURE — 99214 OFFICE O/P EST MOD 30 MIN: CPT | Performed by: FAMILY MEDICINE

## 2025-04-24 PROCEDURE — 3074F SYST BP LT 130 MM HG: CPT | Performed by: FAMILY MEDICINE

## 2025-04-24 RX ORDER — LISINOPRIL 10 MG/1
10 TABLET ORAL DAILY
Qty: 90 TABLET | Refills: 1 | Status: SHIPPED | OUTPATIENT
Start: 2025-04-24 | End: 2025-10-21

## 2025-04-24 RX ORDER — FAMOTIDINE 20 MG/1
20 TABLET, FILM COATED ORAL 2 TIMES DAILY
Qty: 180 TABLET | Refills: 1 | Status: SHIPPED | OUTPATIENT
Start: 2025-04-24 | End: 2025-10-21

## 2025-04-24 RX ORDER — SUCRALFATE 1 G/1
1 TABLET ORAL
Qty: 360 TABLET | Refills: 1 | Status: SHIPPED | OUTPATIENT
Start: 2025-04-24 | End: 2025-10-21

## 2025-04-24 RX ORDER — PANTOPRAZOLE SODIUM 40 MG/1
40 TABLET, DELAYED RELEASE ORAL DAILY
Qty: 90 TABLET | Refills: 1 | Status: SHIPPED | OUTPATIENT
Start: 2025-04-24 | End: 2025-10-21

## 2025-04-24 ASSESSMENT — PATIENT HEALTH QUESTIONNAIRE - PHQ9
2. FEELING DOWN, DEPRESSED OR HOPELESS: NOT AT ALL
1. LITTLE INTEREST OR PLEASURE IN DOING THINGS: NOT AT ALL
SUM OF ALL RESPONSES TO PHQ9 QUESTIONS 1 AND 2: 0

## 2025-04-24 NOTE — PROGRESS NOTES
Subjective   Carolyne Mi is a 49 y.o. female who presents for Follow-up (6 mo rev labs).  Here for routine follow up GERD/gastritis, HTN, vitamin B12 def.  Overall she is doing well.      Her grandmother  from colon cancer, she also has an aunt with rectal cancer.              Objective   Visit Vitals  /78   Pulse 86      Physical Exam  Vitals reviewed.   Constitutional:       General: She is not in acute distress.  Cardiovascular:      Rate and Rhythm: Normal rate and regular rhythm.      Heart sounds: No murmur heard.  Pulmonary:      Effort: Pulmonary effort is normal. No respiratory distress.      Breath sounds: Normal breath sounds.   Skin:     General: Skin is warm and dry.   Neurological:      General: No focal deficit present.      Mental Status: She is alert. Mental status is at baseline.        Latest Reference Range & Units 25 09:45   GLUCOSE 65 - 99 mg/dL 89   SODIUM 135 - 146 mmol/L 139   POTASSIUM 3.5 - 5.3 mmol/L 4.5   CHLORIDE 98 - 110 mmol/L 107   CARBON DIOXIDE 20 - 32 mmol/L 25   ELECTROLYTE BALANCE 7 - 17 mmol/L (calc) 7   UREA NITROGEN (BUN) 7 - 25 mg/dL 10   CREATININE 0.50 - 0.99 mg/dL 0.85   EGFR > OR = 60 mL/min/1.73m2 85   CALCIUM 8.6 - 10.2 mg/dL 9.6   ALBUMIN 3.6 - 5.1 g/dL 4.7   PROTEIN, TOTAL 6.1 - 8.1 g/dL 7.4   ALKALINE PHOSPHATASE 31 - 125 U/L 43   ALT 6 - 29 U/L 12   AST 10 - 35 U/L 13   BILIRUBIN, TOTAL 0.2 - 1.2 mg/dL 0.3   CHOLESTEROL, TOTAL <200 mg/dL 199   HDL CHOLESTEROL > OR = 50 mg/dL 40 (L)   CHOL/HDLC RATIO <5.0 (calc) 5.0 (H)   LDL-CHOLESTEROL mg/dL (calc) 122 (H)   TRIGLYCERIDES <150 mg/dL 244 (H)   NON HDL CHOLESTEROL <130 mg/dL (calc) 159 (H)   VITAMIN B12 200 - 1,100 pg/mL 772   TSH mIU/L 2.32   WHITE BLOOD CELL COUNT 3.8 - 10.8 Thousand/uL 10.3   RED BLOOD CELL COUNT 3.80 - 5.10 Million/uL 5.47 (H)   HEMOGLOBIN 11.7 - 15.5 g/dL 15.2   HEMATOCRIT 35.0 - 45.0 % 47.4 (H)   MCV 80.0 - 100.0 fL 86.7   MCH 27.0 - 33.0 pg 27.8   MCHC 32.0 - 36.0 g/dL 32.1    RDW 11.0 - 15.0 % 14.4   PLATELET COUNT 140 - 400 Thousand/uL 157   MPV 7.5 - 12.5 fL 12.7 (H)   ABSOLUTE NEUTROPHILS 1,500 - 7,800 cells/uL 4,954   ABSOLUTE LYMPHOCYTES 850 - 3,900 cells/uL 4,244 (H)   ABSOLUTE MONOCYTES 200 - 950 cells/uL 876   ABSOLUTE EOSINOPHILS 15 - 500 cells/uL 155   ABSOLUTE BASOPHILS 0 - 200 cells/uL 72   NEUTROPHILS % 48.1   LYMPHOCYTES % 41.2   MONOCYTES % 8.5   EOSINOPHILS % 1.5   BASOPHILS % 0.7   (L): Data is abnormally low  (H): Data is abnormally high    Assessment/Plan   Problem List Items Addressed This Visit       Gastroesophageal reflux disease    Relevant Medications    famotidine (Pepcid) 20 mg tablet    sucralfate (Carafate) 1 gram tablet    pantoprazole (ProtoNix) 40 mg EC tablet    Other Relevant Orders    CBC and Auto Differential    Comprehensive Metabolic Panel    Lipid Panel    TSH with reflex to Free T4 if abnormal    Vitamin B12    Follow Up In Primary Care - Established    Primary hypertension    Relevant Medications    lisinopril 10 mg tablet    Other Relevant Orders    CBC and Auto Differential    Comprehensive Metabolic Panel    Lipid Panel    TSH with reflex to Free T4 if abnormal    Vitamin B12    Follow Up In Primary Care - Established    Vitamin B12 deficiency    Relevant Orders    CBC and Auto Differential    Comprehensive Metabolic Panel    Lipid Panel    TSH with reflex to Free T4 if abnormal    Vitamin B12    Follow Up In Primary Care - Established     Other Visit Diagnoses         Screening for colon cancer    -  Primary    Relevant Orders    Colonoscopy Screening; Average Risk Patient               Mitra Cruz MD

## 2025-04-24 NOTE — PROGRESS NOTES
Subjective   Patient ID: Carolyne Mi is a 49 y.o. female who presents for Follow-up (6 mo rev labs).  HPI    Review of Systems    Objective   Physical Exam    Assessment/Plan            Sheryl Salcedo MA 04/24/25 10:29 AM

## 2025-04-29 ENCOUNTER — TELEPHONE (OUTPATIENT)
Dept: SURGERY | Facility: CLINIC | Age: 50
End: 2025-04-29
Payer: COMMERCIAL

## 2025-04-29 NOTE — TELEPHONE ENCOUNTER
Received Open access colonoscopy from  . Pt will call back to schedule colonoscopy after she check she her schedule.

## 2025-04-30 ENCOUNTER — APPOINTMENT (OUTPATIENT)
Facility: CLINIC | Age: 50
End: 2025-04-30
Payer: COMMERCIAL

## 2025-04-30 VITALS
WEIGHT: 159.9 LBS | DIASTOLIC BLOOD PRESSURE: 70 MMHG | SYSTOLIC BLOOD PRESSURE: 124 MMHG | BODY MASS INDEX: 25.1 KG/M2 | HEIGHT: 67 IN

## 2025-04-30 DIAGNOSIS — Z12.31 ENCOUNTER FOR SCREENING MAMMOGRAM FOR MALIGNANT NEOPLASM OF BREAST: ICD-10-CM

## 2025-04-30 DIAGNOSIS — Z12.4 ENCOUNTER FOR SCREENING FOR CERVICAL CANCER: ICD-10-CM

## 2025-04-30 DIAGNOSIS — Z01.419 ENCOUNTER FOR ANNUAL ROUTINE GYNECOLOGICAL EXAMINATION: ICD-10-CM

## 2025-04-30 PROCEDURE — 3078F DIAST BP <80 MM HG: CPT | Performed by: OBSTETRICS & GYNECOLOGY

## 2025-04-30 PROCEDURE — 3074F SYST BP LT 130 MM HG: CPT | Performed by: OBSTETRICS & GYNECOLOGY

## 2025-04-30 PROCEDURE — 99396 PREV VISIT EST AGE 40-64: CPT | Performed by: OBSTETRICS & GYNECOLOGY

## 2025-04-30 PROCEDURE — 3008F BODY MASS INDEX DOCD: CPT | Performed by: OBSTETRICS & GYNECOLOGY

## 2025-04-30 SDOH — ECONOMIC STABILITY: INCOME INSECURITY: IN THE LAST 12 MONTHS, WAS THERE A TIME WHEN YOU WERE NOT ABLE TO PAY THE MORTGAGE OR RENT ON TIME?: NO

## 2025-04-30 SDOH — ECONOMIC STABILITY: FOOD INSECURITY: WITHIN THE PAST 12 MONTHS, THE FOOD YOU BOUGHT JUST DIDN'T LAST AND YOU DIDN'T HAVE MONEY TO GET MORE.: NEVER TRUE

## 2025-04-30 SDOH — ECONOMIC STABILITY: TRANSPORTATION INSECURITY
IN THE PAST 12 MONTHS, HAS LACK OF TRANSPORTATION KEPT YOU FROM MEETINGS, WORK, OR FROM GETTING THINGS NEEDED FOR DAILY LIVING?: NO

## 2025-04-30 SDOH — ECONOMIC STABILITY: FOOD INSECURITY: WITHIN THE PAST 12 MONTHS, YOU WORRIED THAT YOUR FOOD WOULD RUN OUT BEFORE YOU GOT MONEY TO BUY MORE.: NEVER TRUE

## 2025-04-30 SDOH — ECONOMIC STABILITY: TRANSPORTATION INSECURITY
IN THE PAST 12 MONTHS, HAS THE LACK OF TRANSPORTATION KEPT YOU FROM MEDICAL APPOINTMENTS OR FROM GETTING MEDICATIONS?: NO

## 2025-04-30 ASSESSMENT — LIFESTYLE VARIABLES
AUDIT-C TOTAL SCORE: 1
HOW OFTEN DO YOU HAVE SIX OR MORE DRINKS ON ONE OCCASION: NEVER
HOW OFTEN DO YOU HAVE A DRINK CONTAINING ALCOHOL: MONTHLY OR LESS
HOW MANY STANDARD DRINKS CONTAINING ALCOHOL DO YOU HAVE ON A TYPICAL DAY: 1 OR 2
SKIP TO QUESTIONS 9-10: 1

## 2025-04-30 ASSESSMENT — SOCIAL DETERMINANTS OF HEALTH (SDOH)
WITHIN THE LAST YEAR, HAVE YOU BEEN AFRAID OF YOUR PARTNER OR EX-PARTNER?: NO
WITHIN THE LAST YEAR, HAVE YOU BEEN KICKED, HIT, SLAPPED, OR OTHERWISE PHYSICALLY HURT BY YOUR PARTNER OR EX-PARTNER?: NO
WITHIN THE LAST YEAR, HAVE YOU BEEN HUMILIATED OR EMOTIONALLY ABUSED IN OTHER WAYS BY YOUR PARTNER OR EX-PARTNER?: NO
WITHIN THE LAST YEAR, HAVE TO BEEN RAPED OR FORCED TO HAVE ANY KIND OF SEXUAL ACTIVITY BY YOUR PARTNER OR EX-PARTNER?: NO

## 2025-04-30 ASSESSMENT — PAIN SCALES - GENERAL: PAINLEVEL_OUTOF10: 0-NO PAIN

## 2025-04-30 NOTE — PROGRESS NOTES
"Carolyne Mi is a 49 y.o. female who is here for a routine exam. PCP = Mitra Cruz MD    Chief Complaint   Patient presents with    Gynecologic Exam     Patient is here for yearly exam and pap test. Patient does do regular self breast exams and has no concerns at this time. LMP: 2025.          Presents for annual exam. She voices no complaints and is doing well. Denies any bowel or bladder problems. Denies any breast problems.  She had previous tubal sterilization.    OB History          4    Para   4    Term   2       2    AB   0    Living   2         SAB   0    IAB   0    Ectopic   0    Multiple   0    Live Births   2                  Social History     Substance and Sexual Activity   Sexual Activity Yes    Birth control/protection: Female Sterilization     Current contraception: Tubal Sterilization    Medical History[1]    Surgical History[2]    Past med hx and past surg hx reviewed and notable for: none    Review of Systems:   Constitutional: No fever or chills  Respiratory: No shortness of breath, or cough  Cardiovascular: No chest pain or syncope  Breasts: No breast pain, no masses, no nipple discharge  Gastrointestinal: No nausea, vomiting, or diarrhea, no abdominal pain  Genitourinary: No dysuria or frequency  Gynecology: Negative except as noted in history of present illness  All other: All other systems reviewed and negative for complaint    Objective   /70   Ht 1.702 m (5' 7\")   Wt 72.5 kg (159 lb 14.4 oz)   LMP 2025 (Exact Date)   BMI 25.04 kg/m²     PHYSICAL EXAMINATION:  Chaperone present for exam:  Berkley Lara LPN  Well-developed, well nourished, in no acute distress, alert and oriented x three, is pleasant and cooperative.   HEENT: Clear. Pupils equal, round and reactive to light and accommodation. Extraocular muscles are intact. Oral mucosa pink without exudate.   NECK: No lymphadenopathy, no thyromegaly.  BREASTS: Symmetric, no palpable masses. " No nipple discharge or retraction.  LUNGS: Clear bilaterally.  HEART: Regular rate and rhythm without murmurs.  ABDOMEN: Normoactive bowel sounds, soft and nontender, no guarding or rebound tenderness, no CVA tenderness.  EXTREMITIES: No clubbing, cyanosis or edema.  NEUROLOGIC:  Cranial nerves II-XII grossly intact.  :  Normal external female genitalia, normal vulva, normal vagina. Normal urethral meatus, urethra and bladder. Normal appearing cervix. Normal-sized uterus, no adnexal masses or tenderness. Pap smear performed today.      Actions performed during this visit include:  - Clinical breast exam  - Clinical pelvic exam  -   Orders Placed This Encounter   Procedures    BI mammo bilateral screening tomosynthesis     Standing Status:   Future     Expected Date:   10/22/2025     Expiration Date:   2026     Reason for exam::   Screening     Radiologist to Determine Optimal Study:   Yes     Release result to UserEvents:   Immediate [1]     Is this exam part of a Research Study? If Yes, link this order to the research study:   No        Problem List Items Addressed This Visit    None  Visit Diagnoses         Encounter for annual routine gynecological examination        Relevant Orders    THINPREP PAP TEST      Encounter for screening for cervical cancer        Relevant Orders    THINPREP PAP TEST      Encounter for screening mammogram for malignant neoplasm of breast        Relevant Orders    BI mammo bilateral screening tomosynthesis             Provider Impression:  1.  Annual  2.  Screening mammogram    Thank you for coming to your annual exam. Your findings during the exam were normal.  Please return for your next visit in 1 year.       [1]   Past Medical History:  Diagnosis Date    Encounter for  delivery without indication ([facility])     Delivery of pregnancy by  section    Encounter for gynecological examination (general) (routine) without abnormal findings 2020    Pap test, as part  of routine gynecological examination    GERD (gastroesophageal reflux disease)     Hypertension     Other conditions influencing health status     Menstruation    Personal history of other diseases of the female genital tract     History of menorrhagia    Personal history of other medical treatment     History of screening mammography    Personal history of other mental and behavioral disorders     History of major depression    Personal history of suicidal behavior     History of suicide attempt   [2]   Past Surgical History:  Procedure Laterality Date     SECTION, CLASSIC       and     OTHER SURGICAL HISTORY  2020    Colposcopy    TUBAL LIGATION

## 2025-05-19 ENCOUNTER — HOSPITAL ENCOUNTER (OUTPATIENT)
Dept: OPERATING ROOM | Facility: HOSPITAL | Age: 50
Setting detail: OUTPATIENT SURGERY
Discharge: HOME | End: 2025-05-19
Payer: COMMERCIAL

## 2025-05-19 VITALS
TEMPERATURE: 97.8 F | BODY MASS INDEX: 24.46 KG/M2 | RESPIRATION RATE: 15 BRPM | HEART RATE: 87 BPM | OXYGEN SATURATION: 98 % | HEIGHT: 67 IN | DIASTOLIC BLOOD PRESSURE: 84 MMHG | SYSTOLIC BLOOD PRESSURE: 141 MMHG | WEIGHT: 155.87 LBS

## 2025-05-19 DIAGNOSIS — Z80.0 FAMILY HISTORY OF COLON CANCER: ICD-10-CM

## 2025-05-19 DIAGNOSIS — Z12.11 SCREENING FOR COLON CANCER: ICD-10-CM

## 2025-05-19 PROCEDURE — 3700000012 HC SEDATION LEVEL 5+ TIME - INITIAL 15 MINUTES 5/> YEARS

## 2025-05-19 PROCEDURE — 3600000007 HC OR TIME - EACH INCREMENTAL 1 MINUTE - PROCEDURE LEVEL TWO

## 2025-05-19 PROCEDURE — 3600000002 HC OR TIME - INITIAL BASE CHARGE - PROCEDURE LEVEL TWO

## 2025-05-19 PROCEDURE — 45385 COLONOSCOPY W/LESION REMOVAL: CPT | Performed by: SURGERY

## 2025-05-19 PROCEDURE — 3700000013 HC SEDATION LEVEL 5+ TIME - EACH ADDITIONAL 15 MINUTES

## 2025-05-19 PROCEDURE — 2500000004 HC RX 250 GENERAL PHARMACY W/ HCPCS (ALT 636 FOR OP/ED): Performed by: SURGERY

## 2025-05-19 PROCEDURE — 7100000010 HC PHASE TWO TIME - EACH INCREMENTAL 1 MINUTE

## 2025-05-19 PROCEDURE — 7100000009 HC PHASE TWO TIME - INITIAL BASE CHARGE

## 2025-05-19 RX ORDER — MIDAZOLAM HYDROCHLORIDE 5 MG/ML
INJECTION, SOLUTION INTRAMUSCULAR; INTRAVENOUS AS NEEDED
Status: COMPLETED | OUTPATIENT
Start: 2025-05-19 | End: 2025-05-19

## 2025-05-19 RX ORDER — FENTANYL CITRATE 50 UG/ML
INJECTION, SOLUTION INTRAMUSCULAR; INTRAVENOUS AS NEEDED
Status: COMPLETED | OUTPATIENT
Start: 2025-05-19 | End: 2025-05-19

## 2025-05-19 RX ORDER — LANOLIN ALCOHOL/MO/W.PET/CERES
1000 CREAM (GRAM) TOPICAL DAILY
COMMUNITY

## 2025-05-19 RX ADMIN — GLUCAGON 1 MG: KIT at 08:14

## 2025-05-19 RX ADMIN — MIDAZOLAM HYDROCHLORIDE 2 MG: 5 INJECTION, SOLUTION INTRAMUSCULAR; INTRAVENOUS at 08:18

## 2025-05-19 RX ADMIN — MIDAZOLAM HYDROCHLORIDE 2 MG: 5 INJECTION, SOLUTION INTRAMUSCULAR; INTRAVENOUS at 08:33

## 2025-05-19 RX ADMIN — FENTANYL CITRATE 25 MCG: 50 INJECTION, SOLUTION INTRAMUSCULAR; INTRAVENOUS at 08:22

## 2025-05-19 RX ADMIN — FENTANYL CITRATE 25 MCG: 50 INJECTION, SOLUTION INTRAMUSCULAR; INTRAVENOUS at 08:19

## 2025-05-19 RX ADMIN — MIDAZOLAM HYDROCHLORIDE 4 MG: 5 INJECTION, SOLUTION INTRAMUSCULAR; INTRAVENOUS at 08:13

## 2025-05-19 RX ADMIN — FENTANYL CITRATE 50 MCG: 50 INJECTION, SOLUTION INTRAMUSCULAR; INTRAVENOUS at 08:13

## 2025-05-19 ASSESSMENT — PAIN SCALES - GENERAL
PAINLEVEL_OUTOF10: 0 - NO PAIN
PAINLEVEL_OUTOF10: 2
PAINLEVEL_OUTOF10: 0 - NO PAIN

## 2025-05-19 ASSESSMENT — COLUMBIA-SUICIDE SEVERITY RATING SCALE - C-SSRS
1. IN THE PAST MONTH, HAVE YOU WISHED YOU WERE DEAD OR WISHED YOU COULD GO TO SLEEP AND NOT WAKE UP?: NO
6. HAVE YOU EVER DONE ANYTHING, STARTED TO DO ANYTHING, OR PREPARED TO DO ANYTHING TO END YOUR LIFE?: NO
2. HAVE YOU ACTUALLY HAD ANY THOUGHTS OF KILLING YOURSELF?: NO

## 2025-05-19 NOTE — H&P
General Surgery H&P    Patient: Carolyne Mi  : 1975  MRN: [id number]  Date: 25    Referring Primary Care Provider: Mitra Cruz MD    Chief Complaint: Colon cancer screening    History of Present Illness: Carolyne Mi is a 49 y.o. old female seen at the request of Dr. Cruz for screening colonoscopy.  She has bowel movements daily.  She denies any blood in the stool or change in bowel habits.  She is not on any stool softeners, fiber supplements, or laxatives.  She is not on any blood thinners or antiplatelet agents.  Her maternal grandmother as well as maternal aunt both had colorectal cancer.  Her grandmother was over the age of 60, she believes her aunt was in her late 50s at time of diagnosis of her rectal cancer.  She has never had a colonoscopy.    Medical History:  GERD  Depression    Surgical History:  ,  and   Tubal ligation    Home Medications:  Prior to Admission medications    Medication Sig Start Date End Date Taking? Authorizing Provider   famotidine (Pepcid) 20 mg tablet Take 1 tablet (20 mg) by mouth 2 times a day. 4/24/25 10/21/25  Mitra Cruz MD   ibuprofen 600 mg tablet Take 1 tablet (600 mg) by mouth every 8 hours if needed for mild pain (1 - 3) or moderate pain (4 - 6). 6/3/24   ARAVIND Farah-CNP   lisinopril 10 mg tablet Take 1 tablet (10 mg) by mouth once daily. 4/24/25 10/21/25  Mitra Cruz MD   pantoprazole (ProtoNix) 40 mg EC tablet Take 1 tablet (40 mg) by mouth once daily. Do not crush, chew, or split. 4/24/25 10/21/25  Mitra Cruz MD   sucralfate (Carafate) 1 gram tablet Take 1 tablet (1 g) by mouth 4 times a day before meals. Before meals and at bedtime 4/24/25 10/21/25  Mitra Cruz MD     Allergies:  No known allergies.    Family History:   Maternal grandmother with colon cancer, diagnosed over the age of 60.  Maternal aunt with history of rectal cancer, diagnosed in her late  50s.    Social History:  Smokes a pack of cigarettes daily.  Alcohol use.  No drug use.    ROS:  Constitutional:  no fever, sweats, and chills  Cardiovascular: No chest pain  Respiratory: No cough or shortness of breath  Gastrointestinal: No blood in the stool or change in bowel habits  Genitourinary: no dysuria  Musculoskeletal: no weakness or swelling  Integumentary: no rashes  Neurological: no confusion  Endocrine: no heat or cold intolerance  Heme/Lymph: no easy bruising or bleeding    Physical Exam:  Constitutional: No acute distress, conversant, pleasant  Neurologic: alert and oriented  Psych: appropriate affect  Ears, Nose, Mouth and Throat: mucus membranes moist  Pulmonary: No labored breathing  Cardiovascular: Regular rate and rhythm  Abdomen: Non-distended, BMI 25  Musculoskeletal: Moves all extremities, no edema  Skin: no jaundice    Labs:  Hgb 15.2 on 4/8/25    Imaging:  No pertinent imaging available for review.    Assessment and Plan: Carolyne Mi is a 49 y.o. old female in need of screening colonoscopy.  We discussed the risks of this procedure.  This included risks of bleeding, perforation, missed polyps, incomplete colonoscopy, and potential need for additional procedures pending findings.  She was agreeable to proceed.    Mindi Villarreal MD  5/19/2025

## 2025-05-19 NOTE — DISCHARGE INSTRUCTIONS
Patient Instructions after a Colonoscopy      The anesthetics, sedatives or narcotics which were given to you today will be acting in your body for the next 24 hours, so you might feel a little sleepy or groggy.  This feeling should slowly wear off. Carefully read and follow the instructions.     You received sedation today:  - Do not drive or operate any machinery or power tools of any kind.   - No alcoholic beverages today, not even beer or wine.  - Do not make any important decisions or sign any legal documents.  - No over the counter medications that contain alcohol or that may cause drowsiness.  - Do not make any important decisions or sign any legal documents.  - Make sure you have someone with you for first 24 hours.    While it is common to experience mild to moderate abdominal distention, gas, or belching after your procedure, if any of these symptoms occur following discharge from the GI Lab or within one week of having your procedure, call the [facility] to be advised whether a visit to your nearest Urgent Care or Emergency Department is indicated.  Take this paper with you if you go.     - If you develop an allergic reaction to the medications that were given during your procedure such as difficulty breathing, rash, hives, severe nausea, vomiting or lightheadedness.  - If you experience chest pain, shortness of breath, severe abdominal pain, fevers and chills.  -If you develop signs and symptoms of bleeding such as blood in your spit, if your stools turn black, tarry, or bloody  - If you have not urinated within 8 hours following your procedure.  - If your IV site becomes painful, red, inflamed, or looks infected.    If you received a biopsy/polypectomy/sphincterotomy the following instructions apply below:    __ Do not use Aspirin containing products, non-steroidal medications or anti-coagulants for one week following your procedure. (Examples of these types of medications are: Advil,  Arthrotec, Aleve, Coumadin, Ecotrin, Heparin, Ibuprofen, Indocin, Motrin, Naprosyn, Nuprin, Plavix, Vioxx, and Voltarin, or their generic forms.  This list is not all-inclusive.  Check with your physician or pharmacist before resuming medications.)   __ Eat a soft diet today.  Avoid foods that are poorly digested for the next 24 hours.  These foods would include: nuts, beans, lettuce, red meats, and fried foods. Start with liquids and advance your diet as tolerated, gradually work up to eating solids.   __ Do not have a Barium Study or Enema for one week.    Your physician recommends the additional following instructions:    -You have a contact number available for emergencies. The signs and symptoms of potential delayed complications were discussed with you. You may return to normal activities tomorrow.  -Resume your previous diet.  -Continue your present medications.   -We are waiting for your pathology results.  -Your physician has recommended a repeat colonoscopy (date to be determined after pending pathology results are reviewed) for surveillance based on pathology results.  -The findings and recommendations have been discussed with you.  -The findings and recommendations were discussed with your family.  - Please see Medication Reconciliation Form for new medication/medications prescribed.       If you experience any problems or have any questions following discharge from the GI Lab, please call:  Dr. Villarreal's office.

## 2025-05-20 ENCOUNTER — TELEPHONE (OUTPATIENT)
Dept: SURGERY | Facility: CLINIC | Age: 50
End: 2025-05-20
Payer: COMMERCIAL

## 2025-05-20 NOTE — TELEPHONE ENCOUNTER
Left message to see how patient was doing after colonscopy. Provided office phone number.Pt needs to repeat in 5 years. Dr. Villarreal will call patient with pathology in 2-3 weeks.     Spoke to patient after Colonoscopy. Pt denies fever, chills, nausea, and vomiting. Pt had no questions at this time.  Provided office number to patient for any questions.

## 2025-05-30 LAB
LABORATORY COMMENT REPORT: NORMAL
PATH REPORT.FINAL DX SPEC: NORMAL
PATH REPORT.GROSS SPEC: NORMAL
PATH REPORT.MICROSCOPIC SPEC OTHER STN: NORMAL
PATH REPORT.RELEVANT HX SPEC: NORMAL
PATH REPORT.TOTAL CANCER: NORMAL

## 2025-06-02 ENCOUNTER — TELEPHONE (OUTPATIENT)
Dept: SURGERY | Facility: CLINIC | Age: 50
End: 2025-06-02
Payer: COMMERCIAL

## 2025-06-02 NOTE — TELEPHONE ENCOUNTER
I spoke with the patient over the phone to discuss pathology from recent colonoscopy.  The polyp removed was a tubular adenoma.  Given family history of colon cancer, I recommend 5-year surveillance colonoscopy.    Mindi Villarreal MD  06/02/25  9:51 AM

## 2025-07-18 ENCOUNTER — TELEPHONE (OUTPATIENT)
Age: 50
End: 2025-07-18
Payer: COMMERCIAL

## 2025-07-18 NOTE — TELEPHONE ENCOUNTER
Patient called in stating she has increased sweating. Was unsure if she was starting menopause or what could be causing this. Please advise. Thank you.

## 2025-10-24 ENCOUNTER — APPOINTMENT (OUTPATIENT)
Age: 50
End: 2025-10-24
Payer: COMMERCIAL

## 2026-05-05 ENCOUNTER — APPOINTMENT (OUTPATIENT)
Facility: CLINIC | Age: 51
End: 2026-05-05
Payer: COMMERCIAL